# Patient Record
Sex: MALE | Race: WHITE | Employment: OTHER | URBAN - METROPOLITAN AREA
[De-identification: names, ages, dates, MRNs, and addresses within clinical notes are randomized per-mention and may not be internally consistent; named-entity substitution may affect disease eponyms.]

---

## 2017-04-11 RX ORDER — TADALAFIL 10 MG/1
10 TABLET ORAL PRN
Qty: 30 TABLET | Refills: 3 | Status: SHIPPED | OUTPATIENT
Start: 2017-04-11 | End: 2017-07-11

## 2017-06-21 ENCOUNTER — INITIAL CONSULT (OUTPATIENT)
Dept: SURGERY | Age: 57
End: 2017-06-21
Payer: MEDICAID

## 2017-06-21 VITALS — BODY MASS INDEX: 26.63 KG/M2 | HEIGHT: 70 IN | WEIGHT: 186 LBS

## 2017-06-21 DIAGNOSIS — R10.32 LEFT GROIN PAIN: Primary | ICD-10-CM

## 2017-06-21 PROCEDURE — 99204 OFFICE O/P NEW MOD 45 MIN: CPT | Performed by: SURGERY

## 2017-06-21 RX ORDER — ATORVASTATIN CALCIUM 80 MG/1
TABLET, FILM COATED ORAL
Refills: 11 | COMMUNITY
Start: 2017-06-12 | End: 2017-06-21 | Stop reason: SDUPTHER

## 2017-06-23 ENCOUNTER — HOSPITAL ENCOUNTER (OUTPATIENT)
Age: 57
Discharge: HOME | End: 2017-06-23
Payer: COMMERCIAL

## 2017-06-23 ENCOUNTER — HOSPITAL ENCOUNTER (OUTPATIENT)
Dept: ULTRASOUND IMAGING | Age: 57
Discharge: HOME | End: 2017-06-23
Payer: COMMERCIAL

## 2017-06-23 DIAGNOSIS — R10.32 LEFT GROIN PAIN: ICD-10-CM

## 2017-06-23 LAB
BILIRUBIN URINE: NEGATIVE
COLOR: YELLOW
COMMENT UA: NORMAL
GLUCOSE URINE: NEGATIVE
KETONES, URINE: NEGATIVE
LEUKOCYTE ESTERASE, URINE: NEGATIVE
NITRITE, URINE: NEGATIVE
PH UA: 6 (ref 5–8)
PROTEIN UA: NEGATIVE
SPECIFIC GRAVITY UA: 1.01 (ref 1–1.03)
TURBIDITY: CLEAR
URINE HGB: NEGATIVE
UROBILINOGEN, URINE: NORMAL

## 2017-06-23 PROCEDURE — 76881 US COMPL JOINT R-T W/IMG: CPT

## 2017-06-23 PROCEDURE — 81003 URINALYSIS AUTO W/O SCOPE: CPT

## 2017-06-24 ASSESSMENT — ENCOUNTER SYMPTOMS
SHORTNESS OF BREATH: 0
SORE THROAT: 0
VOMITING: 0
CHOKING: 0
COUGH: 0
NAUSEA: 0
ABDOMINAL PAIN: 0
BLOOD IN STOOL: 0
BACK PAIN: 0
TROUBLE SWALLOWING: 0

## 2017-06-27 ENCOUNTER — OFFICE VISIT (OUTPATIENT)
Dept: SURGERY | Age: 57
End: 2017-06-27
Payer: MEDICAID

## 2017-06-27 VITALS — HEIGHT: 70 IN | WEIGHT: 180.6 LBS | BODY MASS INDEX: 25.86 KG/M2

## 2017-06-27 DIAGNOSIS — R10.32 LEFT GROIN PAIN: Primary | ICD-10-CM

## 2017-06-27 DIAGNOSIS — K40.91 RECURRENT LEFT INGUINAL HERNIA: ICD-10-CM

## 2017-06-27 DIAGNOSIS — Z12.11 ENCOUNTER FOR SCREENING COLONOSCOPY: ICD-10-CM

## 2017-06-27 DIAGNOSIS — Z86.010 HISTORY OF COLON POLYPS: ICD-10-CM

## 2017-06-27 PROCEDURE — 99213 OFFICE O/P EST LOW 20 MIN: CPT | Performed by: SURGERY

## 2017-06-27 ASSESSMENT — ENCOUNTER SYMPTOMS
TROUBLE SWALLOWING: 0
ABDOMINAL PAIN: 0
SORE THROAT: 0
SHORTNESS OF BREATH: 0
BACK PAIN: 0
BLOOD IN STOOL: 0
COUGH: 0
CHOKING: 0
VOMITING: 0
NAUSEA: 0

## 2017-07-11 ENCOUNTER — OFFICE VISIT (OUTPATIENT)
Dept: CARDIOLOGY CLINIC | Age: 57
End: 2017-07-11
Payer: MEDICAID

## 2017-07-11 ENCOUNTER — HOSPITAL ENCOUNTER (OUTPATIENT)
Age: 57
Discharge: HOME | End: 2017-07-11
Payer: COMMERCIAL

## 2017-07-11 VITALS
BODY MASS INDEX: 26.11 KG/M2 | SYSTOLIC BLOOD PRESSURE: 120 MMHG | OXYGEN SATURATION: 99 % | WEIGHT: 182 LBS | HEART RATE: 60 BPM | DIASTOLIC BLOOD PRESSURE: 70 MMHG

## 2017-07-11 DIAGNOSIS — Z95.820 S/P ANGIOPLASTY WITH STENT: ICD-10-CM

## 2017-07-11 DIAGNOSIS — E55.9 VITAMIN D DEFICIENCY DISEASE: ICD-10-CM

## 2017-07-11 DIAGNOSIS — I10 ESSENTIAL HYPERTENSION: ICD-10-CM

## 2017-07-11 DIAGNOSIS — E78.5 HYPERLIPIDEMIA, UNSPECIFIED HYPERLIPIDEMIA TYPE: Primary | ICD-10-CM

## 2017-07-11 DIAGNOSIS — E78.5 HYPERLIPIDEMIA, UNSPECIFIED HYPERLIPIDEMIA TYPE: ICD-10-CM

## 2017-07-11 LAB
ABSOLUTE EOS #: 0.1 K/UL (ref 0–0.4)
ABSOLUTE LYMPH #: 1.2 K/UL (ref 0.9–2.5)
ABSOLUTE MONO #: 0.5 K/UL (ref 0–1)
ALBUMIN SERPL-MCNC: 4.1 G/DL (ref 3.5–5.2)
ALBUMIN/GLOBULIN RATIO: ABNORMAL (ref 1–2.5)
ALP BLD-CCNC: 71 U/L (ref 40–129)
ALT SERPL-CCNC: 27 U/L (ref 5–41)
ANION GAP SERPL CALCULATED.3IONS-SCNC: 11 MMOL/L (ref 9–17)
AST SERPL-CCNC: 23 U/L
BASOPHILS # BLD: 0 %
BASOPHILS ABSOLUTE: 0 K/UL (ref 0–0.2)
BILIRUB SERPL-MCNC: 0.59 MG/DL (ref 0.3–1.2)
BUN BLDV-MCNC: 17 MG/DL (ref 6–20)
BUN/CREAT BLD: 24 (ref 9–20)
CALCIUM SERPL-MCNC: 9.3 MG/DL (ref 8.6–10.4)
CHLORIDE BLD-SCNC: 102 MMOL/L (ref 98–107)
CHOLESTEROL/HDL RATIO: 2.6
CHOLESTEROL: 137 MG/DL
CO2: 27 MMOL/L (ref 20–31)
CREAT SERPL-MCNC: 0.7 MG/DL (ref 0.7–1.2)
DIFFERENTIAL TYPE: YES
EOSINOPHILS RELATIVE PERCENT: 2 %
GFR AFRICAN AMERICAN: >60 ML/MIN
GFR NON-AFRICAN AMERICAN: >60 ML/MIN
GFR SERPL CREATININE-BSD FRML MDRD: ABNORMAL ML/MIN/{1.73_M2}
GFR SERPL CREATININE-BSD FRML MDRD: ABNORMAL ML/MIN/{1.73_M2}
GLUCOSE BLD-MCNC: 107 MG/DL (ref 70–99)
HCT VFR BLD CALC: 43 % (ref 41–53)
HDLC SERPL-MCNC: 53 MG/DL
HEMOGLOBIN: 14.5 G/DL (ref 13.5–17.5)
LDL CHOLESTEROL: 68 MG/DL (ref 0–130)
LYMPHOCYTES # BLD: 20 %
MCH RBC QN AUTO: 33.5 PG (ref 26–34)
MCHC RBC AUTO-ENTMCNC: 33.8 G/DL (ref 31–37)
MCV RBC AUTO: 99 FL (ref 80–100)
MONOCYTES # BLD: 8 %
PATIENT FASTING?: YES
PDW BLD-RTO: 12.6 % (ref 12.1–15.2)
PLATELET # BLD: 158 K/UL (ref 140–450)
PLATELET ESTIMATE: ABNORMAL
PMV BLD AUTO: ABNORMAL FL (ref 6–12)
POTASSIUM SERPL-SCNC: 4.7 MMOL/L (ref 3.7–5.3)
RBC # BLD: 4.34 M/UL (ref 4.5–5.9)
RBC # BLD: ABNORMAL 10*6/UL
SEG NEUTROPHILS: 70 %
SEGMENTED NEUTROPHILS ABSOLUTE COUNT: 4.1 K/UL (ref 2.1–6.5)
SODIUM BLD-SCNC: 140 MMOL/L (ref 135–144)
TOTAL PROTEIN: 6.3 G/DL (ref 6.4–8.3)
TRIGL SERPL-MCNC: 81 MG/DL
TSH SERPL DL<=0.05 MIU/L-ACNC: 1.75 MIU/L (ref 0.3–5)
VLDLC SERPL CALC-MCNC: NORMAL MG/DL (ref 1–30)
WBC # BLD: 5.9 K/UL (ref 3.5–11)
WBC # BLD: ABNORMAL 10*3/UL

## 2017-07-11 PROCEDURE — 85025 COMPLETE CBC W/AUTO DIFF WBC: CPT

## 2017-07-11 PROCEDURE — 36415 COLL VENOUS BLD VENIPUNCTURE: CPT

## 2017-07-11 PROCEDURE — 84443 ASSAY THYROID STIM HORMONE: CPT

## 2017-07-11 PROCEDURE — 99214 OFFICE O/P EST MOD 30 MIN: CPT | Performed by: INTERNAL MEDICINE

## 2017-07-11 PROCEDURE — 80053 COMPREHEN METABOLIC PANEL: CPT

## 2017-07-11 PROCEDURE — 93005 ELECTROCARDIOGRAM TRACING: CPT

## 2017-07-11 PROCEDURE — 80061 LIPID PANEL: CPT

## 2017-07-12 RX ORDER — ATORVASTATIN CALCIUM 80 MG/1
80 TABLET, FILM COATED ORAL DAILY
Qty: 30 TABLET | Refills: 11 | Status: SHIPPED | OUTPATIENT
Start: 2017-07-12 | End: 2018-07-13 | Stop reason: SDUPTHER

## 2017-07-14 LAB
EKG ATRIAL RATE: 45 BPM
EKG P AXIS: 75 DEGREES
EKG P-R INTERVAL: 174 MS
EKG Q-T INTERVAL: 448 MS
EKG QRS DURATION: 96 MS
EKG QTC CALCULATION (BAZETT): 387 MS
EKG R AXIS: 72 DEGREES
EKG T AXIS: 65 DEGREES
EKG VENTRICULAR RATE: 45 BPM

## 2017-07-14 RX ORDER — HYDROCHLOROTHIAZIDE 12.5 MG/1
TABLET ORAL
Qty: 90 TABLET | Refills: 3 | Status: SHIPPED | OUTPATIENT
Start: 2017-07-14 | End: 2018-07-28 | Stop reason: SDUPTHER

## 2017-07-17 ENCOUNTER — ANESTHESIA EVENT (OUTPATIENT)
Dept: OPERATING ROOM | Age: 57
End: 2017-07-17
Payer: MEDICAID

## 2017-07-24 ENCOUNTER — HOSPITAL ENCOUNTER (OUTPATIENT)
Age: 57
Setting detail: OUTPATIENT SURGERY
Discharge: HOME | End: 2017-07-24
Attending: SURGERY | Admitting: SURGERY
Payer: MEDICAID

## 2017-07-24 ENCOUNTER — ANESTHESIA (OUTPATIENT)
Dept: OPERATING ROOM | Age: 57
End: 2017-07-24
Payer: MEDICAID

## 2017-07-24 VITALS
RESPIRATION RATE: 12 BRPM | DIASTOLIC BLOOD PRESSURE: 57 MMHG | OXYGEN SATURATION: 98 % | SYSTOLIC BLOOD PRESSURE: 95 MMHG

## 2017-07-24 VITALS
WEIGHT: 177 LBS | HEART RATE: 43 BPM | DIASTOLIC BLOOD PRESSURE: 67 MMHG | SYSTOLIC BLOOD PRESSURE: 100 MMHG | RESPIRATION RATE: 16 BRPM | TEMPERATURE: 97.8 F | OXYGEN SATURATION: 95 % | HEIGHT: 69 IN | BODY MASS INDEX: 26.22 KG/M2

## 2017-07-24 PROBLEM — Z12.11 ENCOUNTER FOR SCREENING COLONOSCOPY: Status: ACTIVE | Noted: 2017-07-24

## 2017-07-24 PROCEDURE — 7100000010 HC PHASE II RECOVERY - FIRST 15 MIN: Performed by: SURGERY

## 2017-07-24 PROCEDURE — 2580000003 HC RX 258: Performed by: SURGERY

## 2017-07-24 PROCEDURE — 6360000002 HC RX W HCPCS: Performed by: NURSE ANESTHETIST, CERTIFIED REGISTERED

## 2017-07-24 PROCEDURE — 7100000011 HC PHASE II RECOVERY - ADDTL 15 MIN: Performed by: SURGERY

## 2017-07-24 PROCEDURE — 3700000000 HC ANESTHESIA ATTENDED CARE: Performed by: SURGERY

## 2017-07-24 PROCEDURE — 3700000001 HC ADD 15 MINUTES (ANESTHESIA): Performed by: SURGERY

## 2017-07-24 PROCEDURE — 3609027000 HC COLONOSCOPY: Performed by: SURGERY

## 2017-07-24 RX ORDER — 0.9 % SODIUM CHLORIDE 0.9 %
10 VIAL (ML) INJECTION EVERY 12 HOURS SCHEDULED
Status: DISCONTINUED | OUTPATIENT
Start: 2017-07-24 | End: 2017-07-24 | Stop reason: HOSPADM

## 2017-07-24 RX ORDER — ACETAMINOPHEN 325 MG/1
650 TABLET ORAL EVERY 4 HOURS PRN
Status: DISCONTINUED | OUTPATIENT
Start: 2017-07-24 | End: 2017-07-24 | Stop reason: HOSPADM

## 2017-07-24 RX ORDER — 0.9 % SODIUM CHLORIDE 0.9 %
10 VIAL (ML) INJECTION PRN
Status: DISCONTINUED | OUTPATIENT
Start: 2017-07-24 | End: 2017-07-24 | Stop reason: HOSPADM

## 2017-07-24 RX ORDER — SODIUM CHLORIDE 0.9 % (FLUSH) 0.9 %
10 SYRINGE (ML) INJECTION PRN
Status: DISCONTINUED | OUTPATIENT
Start: 2017-07-24 | End: 2017-07-24 | Stop reason: HOSPADM

## 2017-07-24 RX ORDER — PROPOFOL 10 MG/ML
INJECTION, EMULSION INTRAVENOUS PRN
Status: DISCONTINUED | OUTPATIENT
Start: 2017-07-24 | End: 2017-07-24 | Stop reason: SDUPTHER

## 2017-07-24 RX ORDER — ONDANSETRON 2 MG/ML
4 INJECTION INTRAMUSCULAR; INTRAVENOUS EVERY 6 HOURS PRN
Status: DISCONTINUED | OUTPATIENT
Start: 2017-07-24 | End: 2017-07-24 | Stop reason: HOSPADM

## 2017-07-24 RX ORDER — SODIUM CHLORIDE, SODIUM LACTATE, POTASSIUM CHLORIDE, CALCIUM CHLORIDE 600; 310; 30; 20 MG/100ML; MG/100ML; MG/100ML; MG/100ML
INJECTION, SOLUTION INTRAVENOUS CONTINUOUS
Status: DISCONTINUED | OUTPATIENT
Start: 2017-07-24 | End: 2017-07-24 | Stop reason: HOSPADM

## 2017-07-24 RX ORDER — SODIUM CHLORIDE 0.9 % (FLUSH) 0.9 %
10 SYRINGE (ML) INJECTION EVERY 12 HOURS SCHEDULED
Status: DISCONTINUED | OUTPATIENT
Start: 2017-07-24 | End: 2017-07-24 | Stop reason: HOSPADM

## 2017-07-24 RX ORDER — SODIUM CHLORIDE, SODIUM LACTATE, POTASSIUM CHLORIDE, CALCIUM CHLORIDE 600; 310; 30; 20 MG/100ML; MG/100ML; MG/100ML; MG/100ML
INJECTION, SOLUTION INTRAVENOUS CONTINUOUS
Status: DISCONTINUED | OUTPATIENT
Start: 2017-07-24 | End: 2017-07-24 | Stop reason: SDUPTHER

## 2017-07-24 RX ADMIN — SODIUM CHLORIDE, POTASSIUM CHLORIDE, SODIUM LACTATE AND CALCIUM CHLORIDE: 600; 310; 30; 20 INJECTION, SOLUTION INTRAVENOUS at 08:27

## 2017-07-24 RX ADMIN — PROPOFOL 40 MG: 10 INJECTION, EMULSION INTRAVENOUS at 10:08

## 2017-07-24 ASSESSMENT — PAIN DESCRIPTION - DESCRIPTORS: DESCRIPTORS: ACHING

## 2017-07-24 ASSESSMENT — PAIN - FUNCTIONAL ASSESSMENT: PAIN_FUNCTIONAL_ASSESSMENT: 0-10

## 2017-07-24 ASSESSMENT — PAIN SCALES - GENERAL: PAINLEVEL_OUTOF10: 0

## 2017-07-25 ENCOUNTER — ANESTHESIA EVENT (OUTPATIENT)
Dept: OPERATING ROOM | Age: 57
End: 2017-07-25
Payer: MEDICAID

## 2017-07-31 ENCOUNTER — HOSPITAL ENCOUNTER (OUTPATIENT)
Age: 57
Setting detail: OUTPATIENT SURGERY
Discharge: HOME | End: 2017-07-31
Attending: SURGERY | Admitting: SURGERY
Payer: MEDICAID

## 2017-07-31 ENCOUNTER — ANESTHESIA (OUTPATIENT)
Dept: OPERATING ROOM | Age: 57
End: 2017-07-31
Payer: MEDICAID

## 2017-07-31 VITALS
WEIGHT: 181 LBS | HEIGHT: 70 IN | OXYGEN SATURATION: 98 % | RESPIRATION RATE: 16 BRPM | TEMPERATURE: 97 F | DIASTOLIC BLOOD PRESSURE: 77 MMHG | SYSTOLIC BLOOD PRESSURE: 127 MMHG | HEART RATE: 60 BPM | BODY MASS INDEX: 25.91 KG/M2

## 2017-07-31 VITALS
SYSTOLIC BLOOD PRESSURE: 109 MMHG | RESPIRATION RATE: 11 BRPM | TEMPERATURE: 97.9 F | OXYGEN SATURATION: 100 % | DIASTOLIC BLOOD PRESSURE: 70 MMHG

## 2017-07-31 PROBLEM — K40.91 RECURRENT LEFT INGUINAL HERNIA: Status: ACTIVE | Noted: 2017-07-31

## 2017-07-31 PROCEDURE — 3700000001 HC ADD 15 MINUTES (ANESTHESIA): Performed by: SURGERY

## 2017-07-31 PROCEDURE — 3700000000 HC ANESTHESIA ATTENDED CARE: Performed by: SURGERY

## 2017-07-31 PROCEDURE — 64486 TAP BLOCK UNIL BY INJECTION: CPT | Performed by: NURSE ANESTHETIST, CERTIFIED REGISTERED

## 2017-07-31 PROCEDURE — 6360000002 HC RX W HCPCS: Performed by: SURGERY

## 2017-07-31 PROCEDURE — 3600000013 HC SURGERY LEVEL 3 ADDTL 15MIN: Performed by: SURGERY

## 2017-07-31 PROCEDURE — 7100000011 HC PHASE II RECOVERY - ADDTL 15 MIN: Performed by: SURGERY

## 2017-07-31 PROCEDURE — C1781 MESH (IMPLANTABLE): HCPCS | Performed by: SURGERY

## 2017-07-31 PROCEDURE — 2580000003 HC RX 258: Performed by: SURGERY

## 2017-07-31 PROCEDURE — 2500000003 HC RX 250 WO HCPCS: Performed by: SURGERY

## 2017-07-31 PROCEDURE — 6370000000 HC RX 637 (ALT 250 FOR IP): Performed by: SURGERY

## 2017-07-31 PROCEDURE — 2500000003 HC RX 250 WO HCPCS: Performed by: NURSE ANESTHETIST, CERTIFIED REGISTERED

## 2017-07-31 PROCEDURE — 6360000002 HC RX W HCPCS: Performed by: NURSE ANESTHETIST, CERTIFIED REGISTERED

## 2017-07-31 PROCEDURE — 3600000003 HC SURGERY LEVEL 3 BASE: Performed by: SURGERY

## 2017-07-31 PROCEDURE — 7100000010 HC PHASE II RECOVERY - FIRST 15 MIN: Performed by: SURGERY

## 2017-07-31 DEVICE — IMPLANTABLE DEVICE: Type: IMPLANTABLE DEVICE | Status: FUNCTIONAL

## 2017-07-31 RX ORDER — ONDANSETRON 2 MG/ML
INJECTION INTRAMUSCULAR; INTRAVENOUS PRN
Status: DISCONTINUED | OUTPATIENT
Start: 2017-07-31 | End: 2017-07-31 | Stop reason: SDUPTHER

## 2017-07-31 RX ORDER — PROPOFOL 10 MG/ML
INJECTION, EMULSION INTRAVENOUS PRN
Status: DISCONTINUED | OUTPATIENT
Start: 2017-07-31 | End: 2017-07-31 | Stop reason: SDUPTHER

## 2017-07-31 RX ORDER — EPHEDRINE SULFATE 50 MG/ML
INJECTION INTRAVENOUS PRN
Status: DISCONTINUED | OUTPATIENT
Start: 2017-07-31 | End: 2017-07-31 | Stop reason: SDUPTHER

## 2017-07-31 RX ORDER — BUPIVACAINE HYDROCHLORIDE AND EPINEPHRINE 2.5; 5 MG/ML; UG/ML
INJECTION, SOLUTION EPIDURAL; INFILTRATION; INTRACAUDAL; PERINEURAL PRN
Status: DISCONTINUED | OUTPATIENT
Start: 2017-07-31 | End: 2017-07-31 | Stop reason: HOSPADM

## 2017-07-31 RX ORDER — FENTANYL CITRATE 50 UG/ML
INJECTION, SOLUTION INTRAMUSCULAR; INTRAVENOUS PRN
Status: DISCONTINUED | OUTPATIENT
Start: 2017-07-31 | End: 2017-07-31 | Stop reason: SDUPTHER

## 2017-07-31 RX ORDER — DEXAMETHASONE SODIUM PHOSPHATE 10 MG/ML
INJECTION INTRAMUSCULAR; INTRAVENOUS PRN
Status: DISCONTINUED | OUTPATIENT
Start: 2017-07-31 | End: 2017-07-31 | Stop reason: SDUPTHER

## 2017-07-31 RX ORDER — KETOROLAC TROMETHAMINE 30 MG/ML
INJECTION, SOLUTION INTRAMUSCULAR; INTRAVENOUS PRN
Status: DISCONTINUED | OUTPATIENT
Start: 2017-07-31 | End: 2017-07-31 | Stop reason: SDUPTHER

## 2017-07-31 RX ORDER — MIDAZOLAM HYDROCHLORIDE 1 MG/ML
INJECTION INTRAMUSCULAR; INTRAVENOUS PRN
Status: DISCONTINUED | OUTPATIENT
Start: 2017-07-31 | End: 2017-07-31 | Stop reason: SDUPTHER

## 2017-07-31 RX ORDER — SODIUM CHLORIDE, SODIUM LACTATE, POTASSIUM CHLORIDE, CALCIUM CHLORIDE 600; 310; 30; 20 MG/100ML; MG/100ML; MG/100ML; MG/100ML
INJECTION, SOLUTION INTRAVENOUS CONTINUOUS
Status: DISCONTINUED | OUTPATIENT
Start: 2017-07-31 | End: 2017-07-31 | Stop reason: HOSPADM

## 2017-07-31 RX ORDER — MORPHINE SULFATE 4 MG/ML
1 INJECTION, SOLUTION INTRAMUSCULAR; INTRAVENOUS
Status: DISCONTINUED | OUTPATIENT
Start: 2017-07-31 | End: 2017-07-31 | Stop reason: HOSPADM

## 2017-07-31 RX ORDER — SODIUM CHLORIDE 0.9 % (FLUSH) 0.9 %
10 SYRINGE (ML) INJECTION EVERY 12 HOURS SCHEDULED
Status: DISCONTINUED | OUTPATIENT
Start: 2017-07-31 | End: 2017-07-31 | Stop reason: HOSPADM

## 2017-07-31 RX ORDER — LIDOCAINE HYDROCHLORIDE 10 MG/ML
INJECTION, SOLUTION EPIDURAL; INFILTRATION; INTRACAUDAL; PERINEURAL PRN
Status: DISCONTINUED | OUTPATIENT
Start: 2017-07-31 | End: 2017-07-31 | Stop reason: SDUPTHER

## 2017-07-31 RX ORDER — ACETAMINOPHEN 325 MG/1
650 TABLET ORAL EVERY 4 HOURS PRN
Status: DISCONTINUED | OUTPATIENT
Start: 2017-07-31 | End: 2017-07-31 | Stop reason: HOSPADM

## 2017-07-31 RX ORDER — SODIUM CHLORIDE 0.9 % (FLUSH) 0.9 %
10 SYRINGE (ML) INJECTION PRN
Status: DISCONTINUED | OUTPATIENT
Start: 2017-07-31 | End: 2017-07-31 | Stop reason: HOSPADM

## 2017-07-31 RX ORDER — ACETAMINOPHEN 10 MG/ML
INJECTION, SOLUTION INTRAVENOUS PRN
Status: DISCONTINUED | OUTPATIENT
Start: 2017-07-31 | End: 2017-07-31 | Stop reason: SDUPTHER

## 2017-07-31 RX ORDER — LIDOCAINE HYDROCHLORIDE 10 MG/ML
INJECTION, SOLUTION EPIDURAL; INFILTRATION; INTRACAUDAL; PERINEURAL PRN
Status: DISCONTINUED | OUTPATIENT
Start: 2017-07-31 | End: 2017-07-31 | Stop reason: HOSPADM

## 2017-07-31 RX ORDER — ONDANSETRON 2 MG/ML
4 INJECTION INTRAMUSCULAR; INTRAVENOUS EVERY 6 HOURS PRN
Status: DISCONTINUED | OUTPATIENT
Start: 2017-07-31 | End: 2017-07-31 | Stop reason: HOSPADM

## 2017-07-31 RX ORDER — GINSENG 100 MG
CAPSULE ORAL PRN
Status: DISCONTINUED | OUTPATIENT
Start: 2017-07-31 | End: 2017-07-31 | Stop reason: HOSPADM

## 2017-07-31 RX ORDER — HYDROCODONE BITARTRATE AND ACETAMINOPHEN 5; 325 MG/1; MG/1
TABLET ORAL
Qty: 20 TABLET | Refills: 0 | Status: SHIPPED | OUTPATIENT
Start: 2017-07-31 | End: 2017-08-08 | Stop reason: ALTCHOICE

## 2017-07-31 RX ADMIN — DEXAMETHASONE SODIUM PHOSPHATE 10 MG: 10 INJECTION INTRAMUSCULAR; INTRAVENOUS at 09:30

## 2017-07-31 RX ADMIN — ACETAMINOPHEN 1000 MG: 10 INJECTION, SOLUTION INTRAVENOUS at 09:37

## 2017-07-31 RX ADMIN — HYDROMORPHONE HYDROCHLORIDE 0.5 MG: 1 INJECTION, SOLUTION INTRAMUSCULAR; INTRAVENOUS; SUBCUTANEOUS at 10:40

## 2017-07-31 RX ADMIN — ONDANSETRON 4 MG: 2 INJECTION INTRAMUSCULAR; INTRAVENOUS at 09:48

## 2017-07-31 RX ADMIN — FENTANYL CITRATE 50 MCG: 50 INJECTION INTRAMUSCULAR; INTRAVENOUS at 08:47

## 2017-07-31 RX ADMIN — MIDAZOLAM 3 MG: 1 INJECTION INTRAMUSCULAR; INTRAVENOUS at 08:47

## 2017-07-31 RX ADMIN — EPHEDRINE SULFATE 5 MG: 50 INJECTION, SOLUTION INTRAVENOUS at 09:20

## 2017-07-31 RX ADMIN — PROPOFOL 200 MG: 10 INJECTION, EMULSION INTRAVENOUS at 09:06

## 2017-07-31 RX ADMIN — EPHEDRINE SULFATE 5 MG: 50 INJECTION, SOLUTION INTRAVENOUS at 09:15

## 2017-07-31 RX ADMIN — MORPHINE SULFATE 1 MG: 4 INJECTION, SOLUTION INTRAMUSCULAR; INTRAVENOUS at 11:21

## 2017-07-31 RX ADMIN — HYDROMORPHONE HYDROCHLORIDE 0.5 MG: 1 INJECTION, SOLUTION INTRAMUSCULAR; INTRAVENOUS; SUBCUTANEOUS at 10:10

## 2017-07-31 RX ADMIN — LIDOCAINE HYDROCHLORIDE 40 MG: 10 INJECTION, SOLUTION EPIDURAL; INFILTRATION; INTRACAUDAL; PERINEURAL at 09:00

## 2017-07-31 RX ADMIN — MORPHINE SULFATE 1 MG: 4 INJECTION, SOLUTION INTRAMUSCULAR; INTRAVENOUS at 11:50

## 2017-07-31 RX ADMIN — SODIUM CHLORIDE, POTASSIUM CHLORIDE, SODIUM LACTATE AND CALCIUM CHLORIDE: 600; 310; 30; 20 INJECTION, SOLUTION INTRAVENOUS at 07:30

## 2017-07-31 RX ADMIN — KETOROLAC TROMETHAMINE 30 MG: 30 INJECTION, SOLUTION INTRAMUSCULAR at 09:51

## 2017-07-31 RX ADMIN — SODIUM CHLORIDE, POTASSIUM CHLORIDE, SODIUM LACTATE AND CALCIUM CHLORIDE: 600; 310; 30; 20 INJECTION, SOLUTION INTRAVENOUS at 09:32

## 2017-07-31 RX ADMIN — FENTANYL CITRATE 50 MCG: 50 INJECTION INTRAMUSCULAR; INTRAVENOUS at 09:04

## 2017-07-31 ASSESSMENT — PAIN DESCRIPTION - LOCATION: LOCATION: GROIN

## 2017-07-31 ASSESSMENT — PAIN DESCRIPTION - ORIENTATION: ORIENTATION: LEFT

## 2017-07-31 ASSESSMENT — PAIN SCALES - GENERAL: PAINLEVEL_OUTOF10: 10

## 2017-07-31 ASSESSMENT — PAIN - FUNCTIONAL ASSESSMENT: PAIN_FUNCTIONAL_ASSESSMENT: 0-10

## 2017-07-31 ASSESSMENT — PAIN DESCRIPTION - PAIN TYPE: TYPE: SURGICAL PAIN

## 2017-07-31 ASSESSMENT — PAIN DESCRIPTION - DESCRIPTORS: DESCRIPTORS: SORE

## 2017-08-08 ENCOUNTER — OFFICE VISIT (OUTPATIENT)
Dept: SURGERY | Age: 57
End: 2017-08-08

## 2017-08-08 VITALS
WEIGHT: 178.8 LBS | TEMPERATURE: 97.9 F | HEART RATE: 61 BPM | BODY MASS INDEX: 25.6 KG/M2 | DIASTOLIC BLOOD PRESSURE: 79 MMHG | RESPIRATION RATE: 16 BRPM | HEIGHT: 70 IN | SYSTOLIC BLOOD PRESSURE: 125 MMHG

## 2017-08-08 DIAGNOSIS — Z87.19 S/P INGUINAL HERNIA REPAIR: Primary | ICD-10-CM

## 2017-08-08 DIAGNOSIS — Z98.890 S/P INGUINAL HERNIA REPAIR: Primary | ICD-10-CM

## 2017-08-08 PROCEDURE — 99024 POSTOP FOLLOW-UP VISIT: CPT | Performed by: SURGERY

## 2017-12-04 RX ORDER — LISINOPRIL 10 MG/1
TABLET ORAL
Qty: 30 TABLET | Refills: 2 | Status: SHIPPED | OUTPATIENT
Start: 2017-12-04 | End: 2018-01-23 | Stop reason: CLARIF

## 2018-01-23 ENCOUNTER — TELEPHONE (OUTPATIENT)
Dept: CARDIOLOGY CLINIC | Age: 58
End: 2018-01-23

## 2018-01-23 RX ORDER — LISINOPRIL 10 MG/1
5 TABLET ORAL DAILY
COMMUNITY
End: 2018-06-05 | Stop reason: SDUPTHER

## 2018-01-23 NOTE — TELEPHONE ENCOUNTER
Talked with DR Josefina Tillman to decrease lisinopril to 1/2 tab daily  Will keep track of bp let us know

## 2018-01-23 NOTE — TELEPHONE ENCOUNTER
Early this a.m., when stands up or going up stairs or getting up from chair, dizzy, lightheaded. /70, typically not this low. Should meds be adjusted?

## 2018-06-05 DIAGNOSIS — I10 ESSENTIAL HYPERTENSION: ICD-10-CM

## 2018-06-05 DIAGNOSIS — E55.9 VITAMIN D DEFICIENCY: ICD-10-CM

## 2018-06-05 DIAGNOSIS — Z95.820 S/P ANGIOPLASTY WITH STENT: ICD-10-CM

## 2018-06-05 DIAGNOSIS — E78.49 OTHER HYPERLIPIDEMIA: Primary | ICD-10-CM

## 2018-06-05 RX ORDER — LISINOPRIL 10 MG/1
5 TABLET ORAL DAILY
Qty: 30 TABLET | Refills: 3 | Status: SHIPPED | OUTPATIENT
Start: 2018-06-05 | End: 2019-01-27 | Stop reason: SDUPTHER

## 2018-06-07 ENCOUNTER — TELEPHONE (OUTPATIENT)
Dept: CARDIOLOGY CLINIC | Age: 58
End: 2018-06-07

## 2018-07-13 RX ORDER — ATORVASTATIN CALCIUM 80 MG/1
80 TABLET, FILM COATED ORAL DAILY
Qty: 30 TABLET | Refills: 11 | Status: SHIPPED | OUTPATIENT
Start: 2018-07-13 | End: 2019-06-25 | Stop reason: SDUPTHER

## 2018-07-30 RX ORDER — HYDROCHLOROTHIAZIDE 12.5 MG/1
TABLET ORAL
Qty: 30 TABLET | Refills: 2 | Status: SHIPPED | OUTPATIENT
Start: 2018-07-30 | End: 2018-10-28 | Stop reason: SDUPTHER

## 2018-08-10 ENCOUNTER — HOSPITAL ENCOUNTER (OUTPATIENT)
Age: 58
Discharge: HOME | End: 2018-08-10
Payer: MEDICAID

## 2018-08-10 ENCOUNTER — HOSPITAL ENCOUNTER (OUTPATIENT)
Dept: GENERAL RADIOLOGY | Age: 58
Discharge: HOME | End: 2018-08-12
Payer: MEDICAID

## 2018-08-10 ENCOUNTER — HOSPITAL ENCOUNTER (OUTPATIENT)
Age: 58
Discharge: HOME | End: 2018-08-12
Payer: MEDICAID

## 2018-08-10 ENCOUNTER — OFFICE VISIT (OUTPATIENT)
Dept: CARDIOLOGY CLINIC | Age: 58
End: 2018-08-10
Payer: MEDICAID

## 2018-08-10 VITALS
HEART RATE: 41 BPM | OXYGEN SATURATION: 98 % | WEIGHT: 184 LBS | SYSTOLIC BLOOD PRESSURE: 118 MMHG | BODY MASS INDEX: 26.4 KG/M2 | DIASTOLIC BLOOD PRESSURE: 70 MMHG

## 2018-08-10 DIAGNOSIS — I10 ESSENTIAL HYPERTENSION: ICD-10-CM

## 2018-08-10 DIAGNOSIS — Z95.820 S/P ANGIOPLASTY WITH STENT: ICD-10-CM

## 2018-08-10 DIAGNOSIS — E55.9 VITAMIN D DEFICIENCY DISEASE: ICD-10-CM

## 2018-08-10 DIAGNOSIS — E78.49 OTHER HYPERLIPIDEMIA: ICD-10-CM

## 2018-08-10 DIAGNOSIS — E78.5 HYPERLIPIDEMIA, UNSPECIFIED HYPERLIPIDEMIA TYPE: ICD-10-CM

## 2018-08-10 DIAGNOSIS — I10 ESSENTIAL HYPERTENSION: Primary | ICD-10-CM

## 2018-08-10 DIAGNOSIS — E55.9 VITAMIN D DEFICIENCY: ICD-10-CM

## 2018-08-10 LAB
ABSOLUTE EOS #: 0.4 K/UL (ref 0–0.4)
ABSOLUTE IMMATURE GRANULOCYTE: ABNORMAL K/UL (ref 0–0.3)
ABSOLUTE LYMPH #: 1.2 K/UL (ref 1–4.8)
ABSOLUTE MONO #: 0.4 K/UL (ref 0–1)
ALBUMIN SERPL-MCNC: 4.6 G/DL (ref 3.5–5.2)
ALBUMIN/GLOBULIN RATIO: ABNORMAL (ref 1–2.5)
ALP BLD-CCNC: 86 U/L (ref 40–129)
ALT SERPL-CCNC: 31 U/L (ref 5–41)
ANION GAP SERPL CALCULATED.3IONS-SCNC: 10 MMOL/L (ref 9–17)
AST SERPL-CCNC: 21 U/L
BASOPHILS # BLD: 0 % (ref 0–2)
BASOPHILS ABSOLUTE: 0 K/UL (ref 0–0.2)
BILIRUB SERPL-MCNC: 0.62 MG/DL (ref 0.3–1.2)
BUN BLDV-MCNC: 21 MG/DL (ref 6–20)
BUN/CREAT BLD: 29 (ref 9–20)
CALCIUM SERPL-MCNC: 9.3 MG/DL (ref 8.6–10.4)
CHLORIDE BLD-SCNC: 100 MMOL/L (ref 98–107)
CHOLESTEROL/HDL RATIO: 2.7
CHOLESTEROL: 130 MG/DL
CO2: 25 MMOL/L (ref 20–31)
CREAT SERPL-MCNC: 0.72 MG/DL (ref 0.7–1.2)
DIFFERENTIAL TYPE: YES
EKG ATRIAL RATE: 41 BPM
EKG P AXIS: 77 DEGREES
EKG P-R INTERVAL: 182 MS
EKG Q-T INTERVAL: 470 MS
EKG QRS DURATION: 94 MS
EKG QTC CALCULATION (BAZETT): 387 MS
EKG R AXIS: 50 DEGREES
EKG T AXIS: 54 DEGREES
EKG VENTRICULAR RATE: 41 BPM
EOSINOPHILS RELATIVE PERCENT: 6 % (ref 0–5)
GFR AFRICAN AMERICAN: >60 ML/MIN
GFR NON-AFRICAN AMERICAN: >60 ML/MIN
GFR SERPL CREATININE-BSD FRML MDRD: ABNORMAL ML/MIN/{1.73_M2}
GFR SERPL CREATININE-BSD FRML MDRD: ABNORMAL ML/MIN/{1.73_M2}
GLUCOSE BLD-MCNC: 106 MG/DL (ref 70–99)
HCT VFR BLD CALC: 42.1 % (ref 41–53)
HDLC SERPL-MCNC: 48 MG/DL
HEMOGLOBIN: 14.3 G/DL (ref 13.5–17.5)
IMMATURE GRANULOCYTES: ABNORMAL %
LDL CHOLESTEROL: 71 MG/DL (ref 0–130)
LYMPHOCYTES # BLD: 19 % (ref 13–44)
MAGNESIUM: 2.1 MG/DL (ref 1.6–2.6)
MCH RBC QN AUTO: 31.8 PG (ref 26–34)
MCHC RBC AUTO-ENTMCNC: 34.1 G/DL (ref 31–37)
MCV RBC AUTO: 93.3 FL (ref 80–100)
MONOCYTES # BLD: 7 % (ref 5–9)
NRBC AUTOMATED: ABNORMAL PER 100 WBC
PATIENT FASTING?: YES
PDW BLD-RTO: 13.3 % (ref 12.1–15.2)
PLATELET # BLD: 186 K/UL (ref 140–450)
PLATELET ESTIMATE: ABNORMAL
PMV BLD AUTO: ABNORMAL FL (ref 6–12)
POTASSIUM SERPL-SCNC: 4.2 MMOL/L (ref 3.7–5.3)
RBC # BLD: 4.51 M/UL (ref 4.5–5.9)
RBC # BLD: ABNORMAL 10*6/UL
SEG NEUTROPHILS: 68 % (ref 39–75)
SEGMENTED NEUTROPHILS ABSOLUTE COUNT: 4.3 K/UL (ref 2.1–6.5)
SODIUM BLD-SCNC: 135 MMOL/L (ref 135–144)
TOTAL PROTEIN: 6.6 G/DL (ref 6.4–8.3)
TRIGL SERPL-MCNC: 57 MG/DL
TSH SERPL DL<=0.05 MIU/L-ACNC: 1.3 MIU/L (ref 0.3–5)
VITAMIN D 25-HYDROXY: 34.8 NG/ML (ref 30–100)
VLDLC SERPL CALC-MCNC: NORMAL MG/DL (ref 1–30)
WBC # BLD: 6.3 K/UL (ref 3.5–11)
WBC # BLD: ABNORMAL 10*3/UL

## 2018-08-10 PROCEDURE — 36415 COLL VENOUS BLD VENIPUNCTURE: CPT

## 2018-08-10 PROCEDURE — G8427 DOCREV CUR MEDS BY ELIG CLIN: HCPCS | Performed by: INTERNAL MEDICINE

## 2018-08-10 PROCEDURE — 80053 COMPREHEN METABOLIC PANEL: CPT

## 2018-08-10 PROCEDURE — G8419 CALC BMI OUT NRM PARAM NOF/U: HCPCS | Performed by: INTERNAL MEDICINE

## 2018-08-10 PROCEDURE — 82306 VITAMIN D 25 HYDROXY: CPT

## 2018-08-10 PROCEDURE — 80061 LIPID PANEL: CPT

## 2018-08-10 PROCEDURE — 84443 ASSAY THYROID STIM HORMONE: CPT

## 2018-08-10 PROCEDURE — 93005 ELECTROCARDIOGRAM TRACING: CPT

## 2018-08-10 PROCEDURE — 3017F COLORECTAL CA SCREEN DOC REV: CPT | Performed by: INTERNAL MEDICINE

## 2018-08-10 PROCEDURE — 71046 X-RAY EXAM CHEST 2 VIEWS: CPT

## 2018-08-10 PROCEDURE — 4004F PT TOBACCO SCREEN RCVD TLK: CPT | Performed by: INTERNAL MEDICINE

## 2018-08-10 PROCEDURE — 99214 OFFICE O/P EST MOD 30 MIN: CPT | Performed by: INTERNAL MEDICINE

## 2018-08-10 PROCEDURE — 83735 ASSAY OF MAGNESIUM: CPT

## 2018-08-10 PROCEDURE — 85025 COMPLETE CBC W/AUTO DIFF WBC: CPT

## 2018-08-10 RX ORDER — CARVEDILOL 3.12 MG/1
3.12 TABLET ORAL 2 TIMES DAILY WITH MEALS
Qty: 60 TABLET | Refills: 11 | Status: SHIPPED | OUTPATIENT
Start: 2018-08-10 | End: 2019-07-26 | Stop reason: SDUPTHER

## 2018-08-13 NOTE — PROGRESS NOTES
The aorta was not enlarged. No hepatomegaly, splenomegaly. EXTREMITIES:  Good femoral pulses. Good pedal pulses. No pedal edema. Skin was warm and dry. No calf tenderness. Nail beds pink. Good cap refill. PULSES:  Bilaterally symmetrical radial, brachial and carotid pulses. No carotid bruits. Good femoral and pedal pulses. NEUROLOGIC EXAM:  Within normal limits. PSYCHIATRIC EXAM:  Within normal limits. LABORATORY DATA:  Done today, his sodium was 135, potassium 4.2, BUN 21, creatinine 0.72. GFR greater than 60. Magnesium 2.1. Glucose 106. His cholesterol was 130 with an HDL of 48, LDL of 71, triglycerides 57. ALT was 31, AST was 21. TSH was 1.30. Vitamin D 34.8. White count 6.3, hemoglobin 14.3 with a platelet count of 627,310. EKG showed marked sinus bradycardia with a heart rate of 41, otherwise nonspecific ST changes. Chest x-ray has not been read as of yet but was unremarkable. I did not see any pathology on the chest x-ray. IMPRESSION:  1. Sick sinus syndrome with bradycardia, heart rate of 41, on 12.5 mg of Lopressor daily, although he is asymptomatic. 2.  Coronary artery disease. 3.  Catheterization on 12/09/2015 that showed 70% LAD with 40% disease in circumflex and right coronary artery and normal EF of 60%. 4. Angioplasty of the LAD on 12/16/2015, placing a 2.75 x 33 mm drug-eluting Xience stent with a good end result. 5.  Hyperlipidemia, under excellent control. 6.  Hypertension, under excellent control. 7.  Family history of CAD. PLAN:  1. Stop Lopressor. 2.  Start Coreg 3.125 mg b.i.d.  3.  He will call us in several weeks to let us know what his heart rate has been doing at home. DISCUSSION:  Mr. Laura Solo overall is doing well. He has had no chest pain, chest discomfort, or any unusual symptoms that would indicate that he is developing angina. He is bradycardic and he does have sick sinus syndrome.   Currently, he is asymptomatic, but still I

## 2018-09-26 PROBLEM — Z12.11 ENCOUNTER FOR SCREENING COLONOSCOPY: Status: RESOLVED | Noted: 2017-07-24 | Resolved: 2018-09-26

## 2018-10-29 RX ORDER — HYDROCHLOROTHIAZIDE 12.5 MG/1
TABLET ORAL
Qty: 30 TABLET | Refills: 1 | Status: SHIPPED | OUTPATIENT
Start: 2018-10-29 | End: 2019-02-26 | Stop reason: SDUPTHER

## 2018-11-01 RX ORDER — HYDROCHLOROTHIAZIDE 12.5 MG/1
TABLET ORAL
Qty: 30 TABLET | Refills: 1 | Status: SHIPPED | OUTPATIENT
Start: 2018-11-01 | End: 2020-08-18 | Stop reason: ALTCHOICE

## 2019-01-28 RX ORDER — LISINOPRIL 10 MG/1
TABLET ORAL
Qty: 15 TABLET | Refills: 2 | Status: SHIPPED | OUTPATIENT
Start: 2019-01-28 | End: 2019-04-01 | Stop reason: SDUPTHER

## 2019-02-27 RX ORDER — HYDROCHLOROTHIAZIDE 12.5 MG/1
TABLET ORAL
Qty: 30 TABLET | Refills: 0 | Status: SHIPPED | OUTPATIENT
Start: 2019-02-27 | End: 2019-08-09 | Stop reason: CLARIF

## 2019-04-01 RX ORDER — LISINOPRIL 10 MG/1
TABLET ORAL
Qty: 15 TABLET | Refills: 1 | Status: SHIPPED | OUTPATIENT
Start: 2019-04-01 | End: 2019-05-20 | Stop reason: SDUPTHER

## 2019-05-21 RX ORDER — LISINOPRIL 10 MG/1
TABLET ORAL
Qty: 15 TABLET | Refills: 0 | Status: SHIPPED | OUTPATIENT
Start: 2019-05-21 | End: 2019-07-23 | Stop reason: SDUPTHER

## 2019-05-24 RX ORDER — LISINOPRIL 10 MG/1
TABLET ORAL
Qty: 15 TABLET | Refills: 0 | Status: SHIPPED | OUTPATIENT
Start: 2019-05-24 | End: 2019-07-26 | Stop reason: SDUPTHER

## 2019-06-25 RX ORDER — ATORVASTATIN CALCIUM 80 MG/1
TABLET, FILM COATED ORAL
Qty: 30 TABLET | Refills: 10 | Status: SHIPPED | OUTPATIENT
Start: 2019-06-25 | End: 2019-07-26 | Stop reason: SDUPTHER

## 2019-07-24 RX ORDER — LISINOPRIL 10 MG/1
TABLET ORAL
Qty: 15 TABLET | Refills: 0 | Status: SHIPPED | OUTPATIENT
Start: 2019-07-24 | End: 2019-08-09 | Stop reason: CLARIF

## 2019-07-26 RX ORDER — ATORVASTATIN CALCIUM 80 MG/1
TABLET, FILM COATED ORAL
Qty: 90 TABLET | Refills: 3 | Status: SHIPPED | OUTPATIENT
Start: 2019-07-26 | End: 2020-07-27

## 2019-07-26 RX ORDER — CARVEDILOL 3.12 MG/1
3.12 TABLET ORAL 2 TIMES DAILY WITH MEALS
Qty: 180 TABLET | Refills: 3 | Status: SHIPPED | OUTPATIENT
Start: 2019-07-26 | End: 2019-08-09 | Stop reason: ALTCHOICE

## 2019-07-26 RX ORDER — LISINOPRIL 10 MG/1
TABLET ORAL
Qty: 45 TABLET | Refills: 3 | Status: SHIPPED | OUTPATIENT
Start: 2019-07-26 | End: 2020-07-27

## 2019-08-09 ENCOUNTER — OFFICE VISIT (OUTPATIENT)
Dept: CARDIOLOGY CLINIC | Age: 59
End: 2019-08-09
Payer: COMMERCIAL

## 2019-08-09 ENCOUNTER — HOSPITAL ENCOUNTER (OUTPATIENT)
Age: 59
Discharge: HOME | End: 2019-08-09
Payer: COMMERCIAL

## 2019-08-09 ENCOUNTER — HOSPITAL ENCOUNTER (OUTPATIENT)
Age: 59
Discharge: HOME | End: 2019-08-11
Payer: COMMERCIAL

## 2019-08-09 ENCOUNTER — HOSPITAL ENCOUNTER (OUTPATIENT)
Dept: GENERAL RADIOLOGY | Age: 59
Discharge: HOME | End: 2019-08-11
Payer: COMMERCIAL

## 2019-08-09 VITALS
WEIGHT: 193 LBS | OXYGEN SATURATION: 98 % | HEART RATE: 42 BPM | DIASTOLIC BLOOD PRESSURE: 70 MMHG | BODY MASS INDEX: 27.69 KG/M2 | SYSTOLIC BLOOD PRESSURE: 120 MMHG

## 2019-08-09 DIAGNOSIS — I10 ESSENTIAL HYPERTENSION: ICD-10-CM

## 2019-08-09 DIAGNOSIS — E55.9 VITAMIN D DEFICIENCY DISEASE: ICD-10-CM

## 2019-08-09 DIAGNOSIS — E78.49 OTHER HYPERLIPIDEMIA: ICD-10-CM

## 2019-08-09 DIAGNOSIS — Z95.820 S/P ANGIOPLASTY WITH STENT: ICD-10-CM

## 2019-08-09 DIAGNOSIS — I10 ESSENTIAL HYPERTENSION: Primary | ICD-10-CM

## 2019-08-09 LAB
ABSOLUTE EOS #: 0.1 K/UL (ref 0–0.4)
ABSOLUTE IMMATURE GRANULOCYTE: ABNORMAL K/UL (ref 0–0.3)
ABSOLUTE LYMPH #: 1.1 K/UL (ref 1–4.8)
ABSOLUTE MONO #: 0.4 K/UL (ref 0–1)
ALBUMIN SERPL-MCNC: 4.6 G/DL (ref 3.5–5.2)
ALBUMIN/GLOBULIN RATIO: ABNORMAL (ref 1–2.5)
ALP BLD-CCNC: 82 U/L (ref 40–129)
ALT SERPL-CCNC: 30 U/L (ref 5–41)
ANION GAP SERPL CALCULATED.3IONS-SCNC: 9 MMOL/L (ref 9–17)
AST SERPL-CCNC: 18 U/L
BASOPHILS # BLD: 0 % (ref 0–2)
BASOPHILS ABSOLUTE: 0 K/UL (ref 0–0.2)
BILIRUB SERPL-MCNC: 0.63 MG/DL (ref 0.3–1.2)
BUN BLDV-MCNC: 18 MG/DL (ref 6–20)
BUN/CREAT BLD: 23 (ref 9–20)
CALCIUM SERPL-MCNC: 10 MG/DL (ref 8.6–10.4)
CHLORIDE BLD-SCNC: 103 MMOL/L (ref 98–107)
CHOLESTEROL/HDL RATIO: 3
CHOLESTEROL: 130 MG/DL
CO2: 25 MMOL/L (ref 20–31)
CREAT SERPL-MCNC: 0.78 MG/DL (ref 0.7–1.2)
DIFFERENTIAL TYPE: YES
EOSINOPHILS RELATIVE PERCENT: 2 % (ref 0–5)
GFR AFRICAN AMERICAN: >60 ML/MIN
GFR NON-AFRICAN AMERICAN: >60 ML/MIN
GFR SERPL CREATININE-BSD FRML MDRD: ABNORMAL ML/MIN/{1.73_M2}
GFR SERPL CREATININE-BSD FRML MDRD: ABNORMAL ML/MIN/{1.73_M2}
GLUCOSE BLD-MCNC: 113 MG/DL (ref 70–99)
HCT VFR BLD CALC: 41.3 % (ref 41–53)
HDLC SERPL-MCNC: 43 MG/DL
HEMOGLOBIN: 14.4 G/DL (ref 13.5–17.5)
IMMATURE GRANULOCYTES: ABNORMAL %
LDL CHOLESTEROL: 72 MG/DL (ref 0–130)
LYMPHOCYTES # BLD: 21 % (ref 13–44)
MAGNESIUM: 2.3 MG/DL (ref 1.6–2.6)
MCH RBC QN AUTO: 32.5 PG (ref 26–34)
MCHC RBC AUTO-ENTMCNC: 34.9 G/DL (ref 31–37)
MCV RBC AUTO: 93.1 FL (ref 80–100)
MONOCYTES # BLD: 8 % (ref 5–9)
NRBC AUTOMATED: ABNORMAL PER 100 WBC
PATIENT FASTING?: YES
PDW BLD-RTO: 13.7 % (ref 12.1–15.2)
PLATELET # BLD: 180 K/UL (ref 140–450)
PLATELET ESTIMATE: ABNORMAL
PMV BLD AUTO: ABNORMAL FL (ref 6–12)
POTASSIUM SERPL-SCNC: 4.4 MMOL/L (ref 3.7–5.3)
RBC # BLD: 4.44 M/UL (ref 4.5–5.9)
RBC # BLD: ABNORMAL 10*6/UL
SEG NEUTROPHILS: 69 % (ref 39–75)
SEGMENTED NEUTROPHILS ABSOLUTE COUNT: 3.6 K/UL (ref 2.1–6.5)
SODIUM BLD-SCNC: 137 MMOL/L (ref 135–144)
TOTAL PROTEIN: 7.3 G/DL (ref 6.4–8.3)
TRIGL SERPL-MCNC: 73 MG/DL
TSH SERPL DL<=0.05 MIU/L-ACNC: 0.78 MIU/L (ref 0.3–5)
VITAMIN D 25-HYDROXY: 33.2 NG/ML (ref 30–100)
VLDLC SERPL CALC-MCNC: NORMAL MG/DL (ref 1–30)
WBC # BLD: 5.2 K/UL (ref 3.5–11)
WBC # BLD: ABNORMAL 10*3/UL

## 2019-08-09 PROCEDURE — 80053 COMPREHEN METABOLIC PANEL: CPT

## 2019-08-09 PROCEDURE — 83735 ASSAY OF MAGNESIUM: CPT

## 2019-08-09 PROCEDURE — 84443 ASSAY THYROID STIM HORMONE: CPT

## 2019-08-09 PROCEDURE — 4004F PT TOBACCO SCREEN RCVD TLK: CPT | Performed by: INTERNAL MEDICINE

## 2019-08-09 PROCEDURE — 36415 COLL VENOUS BLD VENIPUNCTURE: CPT

## 2019-08-09 PROCEDURE — 82306 VITAMIN D 25 HYDROXY: CPT

## 2019-08-09 PROCEDURE — 93005 ELECTROCARDIOGRAM TRACING: CPT

## 2019-08-09 PROCEDURE — 80061 LIPID PANEL: CPT

## 2019-08-09 PROCEDURE — G8427 DOCREV CUR MEDS BY ELIG CLIN: HCPCS | Performed by: INTERNAL MEDICINE

## 2019-08-09 PROCEDURE — 3017F COLORECTAL CA SCREEN DOC REV: CPT | Performed by: INTERNAL MEDICINE

## 2019-08-09 PROCEDURE — 85025 COMPLETE CBC W/AUTO DIFF WBC: CPT

## 2019-08-09 PROCEDURE — 71046 X-RAY EXAM CHEST 2 VIEWS: CPT

## 2019-08-09 PROCEDURE — G8419 CALC BMI OUT NRM PARAM NOF/U: HCPCS | Performed by: INTERNAL MEDICINE

## 2019-08-09 PROCEDURE — 99214 OFFICE O/P EST MOD 30 MIN: CPT | Performed by: INTERNAL MEDICINE

## 2019-08-09 RX ORDER — CARVEDILOL 3.12 MG/1
3.12 TABLET ORAL DAILY
COMMUNITY
End: 2022-03-14 | Stop reason: SDUPTHER

## 2019-08-12 LAB
EKG ATRIAL RATE: 41 BPM
EKG P AXIS: 70 DEGREES
EKG P-R INTERVAL: 180 MS
EKG Q-T INTERVAL: 464 MS
EKG QRS DURATION: 90 MS
EKG QTC CALCULATION (BAZETT): 382 MS
EKG R AXIS: 64 DEGREES
EKG T AXIS: 57 DEGREES
EKG VENTRICULAR RATE: 41 BPM

## 2019-08-12 PROCEDURE — 93010 ELECTROCARDIOGRAM REPORT: CPT | Performed by: INTERNAL MEDICINE

## 2019-08-16 NOTE — PROGRESS NOTES
Disease:  Negative. Diabetes:  Negative. Smoking:  Negative. MEDICATIONS AT THIS TIME:  He is on aspirin 81 mg daily, Lipitor 80 mg daily, Coreg 3.125 mg b.i.d., HydroDIURIL 12.5 mg daily, lisinopril 10 mg half a tablet daily. PAST MEDICAL HISTORY:  1.  Palpitations. 2.  Hypertension in . 3.  Hyperlipidemia. 4.  Hernia repair 11 years ago on the right side, with a left inguinal hernia repair on 2017. FAMILY HISTORY:  Mother had stents. Multiple aunts and uncles had stenting. Father  at 67. He had 7 brothers and sisters. SOCIAL HISTORY:  He is 61years old. Son, 29, was a drug and alcohol user, and has been sober for 3 years. 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 He does not smoke or drink alcohol. Does not exercise. Stays active. REVIEW OF SYSTEMS:  Cardiac as above. Other systems reviewed including constitutional, eyes, ears, nose and throat, cardiovascular, respiratory, GI, , musculoskeletal, integumentary, neurologic, psychiatric, endocrine, hematologic and allergic/immunologic are negative except for what is described above. PHYSICAL EXAMINATION:  VITAL SIGNS:  His blood pressure was 120/70 with a heart rate of 48 and regular. Respiratory rate 18. O2 saturation 98%. Weight 193 pounds. GENERAL:  He is a pleasant 80-year-old gentleman. Denied pain. He was oriented to person, place and time. Answered questions appropriately. SKIN:  No unusual skin changes. HEENT:  The pupils are equally round and reactive to light and accommodation. Extraocular movements were intact. Mucous membranes were dry. NECK:  No JVD. Good carotid pulses. No carotid bruits. No lymphadenopathy or thyromegaly. CARDIOVASCULAR EXAM:  S1 and S2 were normal.  No S3 or S4. Soft systolic blowing type murmur. No diastolic murmur. PMI was normal.  No lift, thrust, or pericardial friction rub.   LUNGS:  Quite clear to auscultation and percussion. ABDOMEN:  Soft and nontender. Good bowel sounds. EXTREMITIES:  Good femoral pulses. Good pedal pulses. No pedal edema. Skin was warm and dry. No calf tenderness. Nail beds pink. Good cap refill. PULSES:  Bilateral symmetrical radial, brachial and carotid pulses. No carotid bruits. Good femoral and pedal pulses. NEUROLOGIC EXAM:  Within normal limits. PSYCHIATRIC EXAM:  Within normal limits. LABORATORY DATA:  From 08/09/2019, white count was 5.2, hemoglobin 14.4 with a platelet count 780,678. Sodium was 137, potassium 4.4, BUN 18, creatinine 0.78, GFR greater than 60, magnesium 2.3, glucose 113. Cholesterol 138 with an HDL of 43, LDL 72, triglycerides 73. ALT was 30, AST was 18. TSH 0.78. EKG showed sinus bradycardia with heart rate of 41 with nonspecific ST changes. Chest x-ray has not been read as of yet but I did not see anything change and look good to me. Bedside echocardiogram showed normal LV size and function. IMPRESSION:  1. Coronary artery disease. 2.  Catheterization on 12/09/2015, that showed 70% LAD with 40% circumflex and right coronary artery, with normal EF of 60%. 3.  Angioplasty of LAD on 12/16/2015, placing 2.75 x 33 mm drug-eluting Xience stent. 4.  Hyperlipidemia, under excellent control, with his current LDL at 72, HDL 43.  5.  Hypertension, under good control. 6.  Family history of coronary artery disease. 7.  Mild sick sinus syndrome with heart rate of 41 on Coreg 3.125 mg b.i.d. PLAN:  1. Decrease Coreg to 3.125 mg once a day in the evening. 2.  See in 1 year. 3.  Start an exercise program and if he would have any unusual shortness of breath, loss of energy or chest pain, I would want to see him as he does have multiple risk factors of CAD and has had angioplasty of the LAD. DISCUSSION:  Mr. Gerry Chacko overall is doing well.   He has had no chest pain or chest discomfort, any unusual shortness of breath or loss of

## 2019-08-19 RX ORDER — LISINOPRIL 10 MG/1
TABLET ORAL
Qty: 15 TABLET | Refills: 0 | Status: SHIPPED | OUTPATIENT
Start: 2019-08-19 | End: 2020-11-19 | Stop reason: DRUGHIGH

## 2019-08-19 RX ORDER — CARVEDILOL 3.12 MG/1
TABLET ORAL
Qty: 8 TABLET | Refills: 10 | Status: SHIPPED | OUTPATIENT
Start: 2019-08-19 | End: 2020-07-08

## 2020-07-07 ENCOUNTER — TELEPHONE (OUTPATIENT)
Dept: CARDIOLOGY CLINIC | Age: 60
End: 2020-07-07

## 2020-07-07 NOTE — TELEPHONE ENCOUNTER
Patient has a CDL physical coming up in Sep. They require a stress test done every so often in order to clear him. He would like to know if we can order it and he have it done on the same day of his apt.

## 2020-07-08 RX ORDER — CARVEDILOL 3.12 MG/1
TABLET ORAL
Qty: 180 TABLET | Refills: 3 | Status: SHIPPED | OUTPATIENT
Start: 2020-07-08 | End: 2020-08-18 | Stop reason: ALTCHOICE

## 2020-07-27 RX ORDER — LISINOPRIL 10 MG/1
TABLET ORAL
Qty: 45 TABLET | Refills: 3 | Status: SHIPPED | OUTPATIENT
Start: 2020-07-27 | End: 2020-08-18 | Stop reason: SDUPTHER

## 2020-07-27 RX ORDER — ATORVASTATIN CALCIUM 80 MG/1
TABLET, FILM COATED ORAL
Qty: 90 TABLET | Refills: 3 | Status: SHIPPED | OUTPATIENT
Start: 2020-07-27 | End: 2021-07-20

## 2020-08-18 ENCOUNTER — HOSPITAL ENCOUNTER (OUTPATIENT)
Age: 60
Discharge: HOME | End: 2020-08-18
Payer: COMMERCIAL

## 2020-08-18 ENCOUNTER — HOSPITAL ENCOUNTER (OUTPATIENT)
Age: 60
Discharge: HOME | End: 2020-08-20
Payer: COMMERCIAL

## 2020-08-18 ENCOUNTER — HOSPITAL ENCOUNTER (OUTPATIENT)
Dept: GENERAL RADIOLOGY | Age: 60
Discharge: HOME | End: 2020-08-20
Payer: COMMERCIAL

## 2020-08-18 ENCOUNTER — HOSPITAL ENCOUNTER (OUTPATIENT)
Dept: NON INVASIVE DIAGNOSTICS | Age: 60
Discharge: HOME | End: 2020-08-18
Payer: COMMERCIAL

## 2020-08-18 ENCOUNTER — OFFICE VISIT (OUTPATIENT)
Dept: CARDIOLOGY CLINIC | Age: 60
End: 2020-08-18
Payer: COMMERCIAL

## 2020-08-18 VITALS
DIASTOLIC BLOOD PRESSURE: 70 MMHG | BODY MASS INDEX: 29.13 KG/M2 | SYSTOLIC BLOOD PRESSURE: 136 MMHG | HEART RATE: 82 BPM | WEIGHT: 203 LBS | OXYGEN SATURATION: 97 %

## 2020-08-18 LAB
ABSOLUTE EOS #: 0.1 K/UL (ref 0–0.4)
ABSOLUTE IMMATURE GRANULOCYTE: NORMAL K/UL (ref 0–0.3)
ABSOLUTE LYMPH #: 1.3 K/UL (ref 1–4.8)
ABSOLUTE MONO #: 0.4 K/UL (ref 0–1)
ALBUMIN SERPL-MCNC: 4.7 G/DL (ref 3.5–5.2)
ALBUMIN/GLOBULIN RATIO: ABNORMAL (ref 1–2.5)
ALP BLD-CCNC: 77 U/L (ref 40–129)
ALT SERPL-CCNC: 25 U/L (ref 5–41)
ANION GAP SERPL CALCULATED.3IONS-SCNC: 8 MMOL/L (ref 9–17)
AST SERPL-CCNC: 20 U/L
BASOPHILS # BLD: 0 % (ref 0–2)
BASOPHILS ABSOLUTE: 0 K/UL (ref 0–0.2)
BILIRUB SERPL-MCNC: 0.47 MG/DL (ref 0.3–1.2)
BUN BLDV-MCNC: 15 MG/DL (ref 8–23)
BUN/CREAT BLD: 16 (ref 9–20)
CALCIUM SERPL-MCNC: 9.5 MG/DL (ref 8.6–10.4)
CHLORIDE BLD-SCNC: 105 MMOL/L (ref 98–107)
CHOLESTEROL/HDL RATIO: 3.2
CHOLESTEROL: 121 MG/DL
CO2: 24 MMOL/L (ref 20–31)
CREAT SERPL-MCNC: 0.91 MG/DL (ref 0.7–1.2)
DIFFERENTIAL TYPE: YES
EOSINOPHILS RELATIVE PERCENT: 2 % (ref 0–5)
GFR AFRICAN AMERICAN: >60 ML/MIN
GFR NON-AFRICAN AMERICAN: >60 ML/MIN
GFR SERPL CREATININE-BSD FRML MDRD: ABNORMAL ML/MIN/{1.73_M2}
GFR SERPL CREATININE-BSD FRML MDRD: ABNORMAL ML/MIN/{1.73_M2}
GLUCOSE BLD-MCNC: 108 MG/DL (ref 70–99)
HCT VFR BLD CALC: 44.4 % (ref 41–53)
HDLC SERPL-MCNC: 38 MG/DL
HEMOGLOBIN: 15.2 G/DL (ref 13.5–17.5)
IMMATURE GRANULOCYTES: NORMAL %
LDL CHOLESTEROL: 71 MG/DL (ref 0–130)
LYMPHOCYTES # BLD: 21 % (ref 13–44)
MAGNESIUM: 2.2 MG/DL (ref 1.6–2.6)
MCH RBC QN AUTO: 31.8 PG (ref 26–34)
MCHC RBC AUTO-ENTMCNC: 34.2 G/DL (ref 31–37)
MCV RBC AUTO: 92.9 FL (ref 80–100)
MONOCYTES # BLD: 7 % (ref 5–9)
NRBC AUTOMATED: NORMAL PER 100 WBC
PATIENT FASTING?: YES
PDW BLD-RTO: 13.3 % (ref 12.1–15.2)
PLATELET # BLD: 182 K/UL (ref 140–450)
PLATELET ESTIMATE: NORMAL
PMV BLD AUTO: NORMAL FL (ref 6–12)
POTASSIUM SERPL-SCNC: 4.4 MMOL/L (ref 3.7–5.3)
RBC # BLD: 4.78 M/UL (ref 4.5–5.9)
RBC # BLD: NORMAL 10*6/UL
SEG NEUTROPHILS: 70 % (ref 39–75)
SEGMENTED NEUTROPHILS ABSOLUTE COUNT: 4.2 K/UL (ref 2.1–6.5)
SODIUM BLD-SCNC: 137 MMOL/L (ref 135–144)
TOTAL PROTEIN: 7.1 G/DL (ref 6.4–8.3)
TRIGL SERPL-MCNC: 59 MG/DL
TSH SERPL DL<=0.05 MIU/L-ACNC: 1.21 MIU/L (ref 0.3–5)
VITAMIN D 25-HYDROXY: 31.1 NG/ML (ref 30–100)
VLDLC SERPL CALC-MCNC: ABNORMAL MG/DL (ref 1–30)
WBC # BLD: 6 K/UL (ref 3.5–11)
WBC # BLD: NORMAL 10*3/UL

## 2020-08-18 PROCEDURE — 82306 VITAMIN D 25 HYDROXY: CPT

## 2020-08-18 PROCEDURE — 93017 CV STRESS TEST TRACING ONLY: CPT

## 2020-08-18 PROCEDURE — 99214 OFFICE O/P EST MOD 30 MIN: CPT | Performed by: INTERNAL MEDICINE

## 2020-08-18 PROCEDURE — 84443 ASSAY THYROID STIM HORMONE: CPT

## 2020-08-18 PROCEDURE — 80053 COMPREHEN METABOLIC PANEL: CPT

## 2020-08-18 PROCEDURE — 83735 ASSAY OF MAGNESIUM: CPT

## 2020-08-18 PROCEDURE — 71046 X-RAY EXAM CHEST 2 VIEWS: CPT

## 2020-08-18 PROCEDURE — 36415 COLL VENOUS BLD VENIPUNCTURE: CPT

## 2020-08-18 PROCEDURE — 80061 LIPID PANEL: CPT

## 2020-08-18 PROCEDURE — 85025 COMPLETE CBC W/AUTO DIFF WBC: CPT

## 2020-08-18 NOTE — LETTER
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 No restrictions with driving. If you have any questions or concerns, please don't hesitate to call.     Sincerely,        Nisha Liriano MD

## 2020-08-18 NOTE — PROCEDURES
[place] [address]                              CARDIAC STRESS TEST    PATIENT NAME: Harris Mari                    :        1960  MED REC NO:   [id number]                              ROOM:  ACCOUNT NO:   [de-identified]                           ADMIT DATE: 2020  PROVIDER:     Jonas Hernandez      DATE OF STUDY:  2020    TREADMILL STRESS TEST    REASON FOR TEST:  DOT physical.    The patient exercised for 6 minutes on accelerated Ruy protocol  achieving 9.9 METs. His underlying rhythm was atrial fibrillation with  a controlled ventricular response, got his heart rate up to 169 which is  100% maximum predicted heart rate. He has no chest pain, no ST  depression. Overall negative, asymptomatic stress test with good  exercise capacity. No EKG changes to indicate ischemia. This is  overall a normal treadmill cardiac stress test with good exercise  capacity.         Julieth Rosado    D: 2020 13:59:41       T: 2020 14:12:30     [id number]  Job#: [id number]     Doc#: [id number]    CC:

## 2020-08-18 NOTE — PROGRESS NOTES
Ov Dr. Gracy Merchant for one year follow up  Here to review all same day testing   Stress/labs/ekg/cxr   Bedside echo done      Will check prices of Xarelto/Pradaxa/Eliquis/Coumadin    Will refer for sleep study ([facility])    Will set up for event monitor     Follow up in 4 weeks with cardioversion    Will set up for echo  in 4 weeks      Ashly Lowry M.D. [address] [address]  [phone]        2020        Harveyrachel Gilbert, [address] [facility], [address] [place] [address]    RE:   Blanca Clark  :  1960    Dear Dr. Geovani Gutiérrez:    CHIEF COMPLAINT:  1. Coronary artery disease. 2.  Mild sick sinus syndrome. 3.  Asymptomatic with no chest pain, loss of energy or shortness of breath. HISTORY OF PRESENT ILLNESS:  I saw Mr. Castillo in the office on 2020. He is a pleasant 42-year-old gentleman, who I met on 2015, because of shortness of breath and severe hypertension and chest pain. He had a stress test, which was abnormal.  An echocardiogram on 2015, showed mild mitral regurgitation, EF of 55%. I did a catheterization on 2015, that showed 70% disease in the proximal LAD with an FFR of 0.81, with 40% disease in the right coronary artery and circumflex, EF of 60%. He continued to have discomfort, and therefore, I brought him back on 2015, and placed a 2.75 x 33 mm drug-eluting Xience in the LAD with a good end result. He has had no further cardiac catheterizations. I see him once a year. He does have mild sick sinus syndrome with heart rates in the 40s and 50s historically. When I saw him 1 year ago, I decreased his Coreg to 3.125 mg once a day from twice a day. He has had no hospitalizations or procedures since I saw him 1 year ago. His energy level is good. He has had no shortness of breath. No chest pain. No palpitations. No syncope or near syncope, lightheadedness or dizziness. He feels \"great\" as I see him today. 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    CARDIAC RISK FACTORS:  Known CAD:  Positive. Hypertension:  Positive. Hyperlipidemia:  Positive. Other Family Members:  Positive. Peripheral Vascular Disease:  Negative. Diabetes:  Negative. Smoking:  Negative. MEDICATIONS AT HOME:  He is currently on aspirin 81 mg daily, Lipitor 80 mg daily, Coreg 3.125 mg daily, lisinopril 10 mg half a tablet daily. PAST MEDICAL HISTORY:  1.  Palpitations. 2.  Hypertension in . 3.  Hyperlipidemia. 4.  Hernia repair 12 years ago on the right side, with left inguinal hernia repair on 2017. FAMILY HISTORY:  Mother had stents. Multiple aunts and uncles had stenting. Father  at 67. He has 7 brothers and sisters. SOCIAL HISTORY:  He is 61years old. Son, 34, with drug and alcohol use, has been sober for 4 years. He works in the [employer] and going to school in [place] to become a counselor. He does not smoke or drink alcohol. Does not exercise. Stays active. 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 REVIEW OF SYSTEMS:  Cardiac as above. Other systems reviewed including constitutional, eyes, ears, nose and throat, cardiovascular, respiratory, GI, , musculoskeletal, integumentary, neurologic, endocrine, hematologic and allergic/immunologic, which were all negative except for what is described above. No weight loss or weight gain. No change in bowel habits, no blood in stools. No fevers, sweats or chills. He does have a snoring history, although he has never had a sleep study. PHYSICAL EXAMINATION:  VITAL SIGNS:   His blood pressure was 136/70 with a heart rate of 80 and irregularly irregular. Respiratory rate 18. O2 sat was 97%. Weight 203 pounds. GENERAL:  He is a pleasant 40-year-old gentleman. Denied pain. He was oriented to person, place and time. Answered questions appropriately.   SKIN:  No unusual skin changes. HEENT:  The pupils are equally round and intact. Mucous membranes were dry. NECK:  No JVD. Good carotid pulses. No carotid bruits. No lymphadenopathy or thyromegaly. CARDIOVASCULAR EXAM:  S1 and S2 were normal.  No S3 or S4. He was irregularly irregular. Soft systolic blowing type murmur. No diastolic murmur. LUNGS:  Quite clear to auscultation and percussion. ABDOMEN:  Soft and nontender. Good bowel sounds. The aorta was not enlarged. No hepatomegaly, splenomegaly. EXTREMITIES:  Good femoral pulses. Good pedal pulses. No pedal edema. Skin was warm and dry. No calf tenderness. Nail beds pink. Good cap refill. PULSES:  Bilateral symmetrical radial, brachial and carotid pulses. No carotid bruits. Good femoral and pedal pulses. NEUROLOGIC EXAM:  Within normal limits. PSYCHIATRIC EXAM:  Within normal limits. LABORATORY DATA:  Today, sodium was 137, potassium 4.4, BUN 15, creatinine 0.91, GFR greater than 60, magnesium was 2.2, glucose 108, calcium was 9.5. His ALT was 25, AST was 20. Glucose was 108. TSH 1.21. White count 6.0 with hemoglobin of 15.2 and platelet count 660,377. We did a cardiac stress test today. He exercised 6 minutes on accelerated Ruy protocol, achieving 10 METs. His peak heart rate was 169, which is 100% of maximum predicted heart rate. He was in atrial fibrillation before, during and after the stress test.  He had no ST depression. No chest pain. It was a normal cardiac stress test with a good exercise capacity with no evidence of ischemia either by symptoms or by EKG criteria. Resting EKG showed atrial fibrillation with a controlled ventricular response. This was a new finding for the atrial fibrillation. Bedside echocardiogram showed normal LV size and function. His right-sided chambers seemed mildly enlarged.     IMPRESSION:  1.  New onset of atrial fibrillation by EKG done today, with him being totally asymptomatic and with his heart rate very well controlled. 2.  Normal treadmill cardiac stress test, with no chest pain or ST depression, with a good exercise capacity achieving 10 METs. 3.  Normal LV function by bedside echocardiogram, with slightly dilated right-sided chambers, most likely from occult sleep apnea. 4.  Coronary artery disease, status post catheterization on 12/09/2015, that showed 70% LAD, 40% circumflex and right coronary artery, normal EF of 60%. 5.  Angioplasty of the LAD on 12/16/2015, placing 2.75 x 33 mm drug-eluting Xience stent. 6.  Hyperlipidemia, under excellent control, with lipids pending. 7.  Hypertension, under good control. 8.  Family history of coronary artery disease. 9.  History of mild sick sinus syndrome, currently on Coreg 3.125 mg daily. PLAN:  1. We will start Xarelto 20 mg daily. 2.  We will send him for a sleep study because of his mildly dilated right-sided chambers and because of his snoring history and because of his new onset of atrial fibrillation and daytime somnolence. 3.  We will see in 4 weeks for a cardioversion. 4.  We will do an echocardiogram after we do the cardioversion. 5.  We will give him an event recorder after we do the cardioversion. 6.  He is cleared from a cardiac standpoint for his DOT physical with no restrictions as he is totally asymptomatic. DISCUSSION:  Mr. Gin Lee has been totally asymptomatic. He has had no chest pain, loss of energy, shortness of breath or palpitations. He, however, is in atrial fibrillation as we see him today. This is a new finding. His rate is very well controlled, and therefore, I am not surprised that this is totally asymptomatic. His stress test showed good exercise capacity, achieving 10 METs, with no chest pain or ST depression. He will need to be anticoagulated as he has a CHADS score of 2.   We have given him samples of Xarelto and he will check the cost of Pradaxa, Xarelto and Eliquis, to see if they are financially feasible. He does have a snoring history with some daytime somnolence. With his right-sided chambers being mildly enlarged and his new onset of atrial fibrillation, I will do a sleep study to see if he has occult sleep apnea. This could be contributing to his atrial fibrillation. He will need to be anticoagulated long-term. Hopefully, we can use a NOAC if the insurance company will approve. We will try to cardiovert him in 4 weeks and we will do an echocardiogram after the cardioversion. Again, he is asymptomatic, and therefore, I would not place him on antiarrhythmics to try to control his atrial fibrillation. We could consider a very low dose of a calcium-channel blocker, such as low-dose Cardizem at 30 mg twice a day. We have to, however, watch carefully for further bradycardia. Again, there are no restrictions or limitations and he is cleared from a cardiac standpoint for his DOT physical.    Thank you very much for allowing me the privilege of seeing Mr. Castillo. If you have any questions on my thoughts, please do not hesitate to contact me.      Sincerely,        Avinash Rowan    D: 08/18/2020 12:58:04     T: 08/18/2020 13:06:24     [email or web address]  Job#: [id number]   Doc#: [id number]

## 2020-08-18 NOTE — PATIENT INSTRUCTIONS
Will check prices of Xarelto/Pradaxa/Eliquis/Coumadin    Will refer for sleep study ([facility])    Will set up for event monitor     Follow up in 4 weeks with cardioversion    Will set up for echo in [place] in 4 weeks

## 2020-09-01 ENCOUNTER — HOSPITAL ENCOUNTER (OUTPATIENT)
Dept: NON INVASIVE DIAGNOSTICS | Age: 60
Discharge: HOME | End: 2020-09-01
Payer: COMMERCIAL

## 2020-09-01 ENCOUNTER — HOSPITAL ENCOUNTER (OUTPATIENT)
Age: 60
Discharge: HOME | End: 2020-09-01
Payer: COMMERCIAL

## 2020-09-01 LAB
LV EF: 55 %
LVEF MODALITY: NORMAL

## 2020-09-01 PROCEDURE — 93005 ELECTROCARDIOGRAM TRACING: CPT

## 2020-09-01 PROCEDURE — 93306 TTE W/DOPPLER COMPLETE: CPT

## 2020-09-01 NOTE — PROGRESS NOTES
Cardioversion    He was in NSR on day of cardioversion, therefore, it was not done. He will continue Xarelto. No 30 day event recorder. He will have echo. Repeat ekg in 1 month.     Thanks, Heaven Castaneda MD

## 2020-09-01 NOTE — H&P
I had the pleasure of seeing Mr. Castillo in the office on 08/18/2020. He is a pleasant 51-year-old gentleman who I met on 11/19/2015, because of shortness of breath and severe hypertension and also chest pain. He had a stress test, which was abnormal, and echocardiogram on 12/01/2015, that showed mild mitral regurgitation, EF of 55%. I did a catheterization on 12/09/2015, that showed 70% disease in the proximal LAD with an FFR of 0.81 with 40% right coronary artery and circumflex disease, EF of 60%. He continued to have discomfort, and therefore, I brought him back for angioplasty on 12/16/2015, placing 2.75 x 33 mm drug-eluting Xience stent in the LAD with a good end result.     He has done well over the past year. His heart rate remains somewhat low in the high 40s and low 50s range. He has no lightheadedness or dizziness unless he squats down and stands up rapidly.      He has had no chest pain or chest discomfort. Energy level has been good. He has not been exercising on a regular basis but stays busy. Again, he has had no hospitalizations or procedures.     His son, who is 29, was a drug and alcohol user, and has been sober for 3 years. He is an apprentice in [employer], but is not going to school to be a psychologist to help other addicts. Mr. Zenda Dance does not smoke or drink alcohol. Does not exercise. Stays active. He is not working at this time. He has developed atrial fibrillation.     CARDIAC RISK FACTORS:  Known CAD:  Positive. Hypertension:  Positive. Hyperlipidemia:  Positive. Other Family Members:  Positive. Peripheral Vascular Disease:  Negative. Diabetes:  Negative. Smoking:  Negative.     MEDICATIONS AT THIS TIME:  He is on aspirin 81 mg daily, Lipitor 80 mg daily, Coreg 3.125 mg b.i.d., HydroDIURIL 12.5 mg daily, lisinopril 10 mg half a tablet daily.     PAST MEDICAL HISTORY:  1.  Palpitations. 2.  Hypertension in 2013. 3.  Hyperlipidemia.   4.  Hernia repair 11 years ago

## 2020-09-01 NOTE — PROGRESS NOTES
Rachel Jackson M.D. [address] [place] [address] [facility]   [phone]        2020        Nayely Rogel, [address] [facility], [address] [place] [address]    RE:   Marie Littlejohn  :  1960    Dear Dr. Jamie Fabry:    CHIEF COMPLAINT:  1. Coronary artery disease. 2.  Mild sick sinus syndrome. 3.  Asymptomatic with no chest pain, loss of energy or shortness of breath. HISTORY OF PRESENT ILLNESS:  I saw Mr. Castillo in the office on 2020. He is a pleasant 71-year-old gentleman, who I met on 2015, because of shortness of breath and severe hypertension and chest pain. He had a stress test, which was abnormal.  An echocardiogram on 2015, showed mild mitral regurgitation, EF of 55%. I did a catheterization on 2015, that showed 70% disease in the proximal LAD with an FFR of 0.81, with 40% disease in the right coronary artery and circumflex, EF of 60%. He continued to have discomfort, and therefore, I brought him back on 2015, and placed a 2.75 x 33 mm drug-eluting Xience in the LAD with a good end result. He has had no further cardiac catheterizations. I see him once a year. He does have mild sick sinus syndrome with heart rates in the 40s and 50s historically. When I saw him 1 year ago, I decreased his Coreg to 3.125 mg once a day from twice a day. He has had no hospitalizations or procedures since I saw him 1 year ago. His energy level is good. He has had no shortness of breath. No chest pain. No palpitations. No syncope or near syncope, lightheadedness or dizziness. He feels \"great\" as I see him today. 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    CARDIAC RISK FACTORS:  Known CAD:  Positive. Hypertension:  Positive. Hyperlipidemia:  Positive. Other Family Members:  Positive. Peripheral Vascular Disease:  Negative. Diabetes:  Negative.   Smoking: Negative. MEDICATIONS AT HOME:  He is currently on aspirin 81 mg daily, Lipitor 80 mg daily, Coreg 3.125 mg daily, lisinopril 10 mg half a tablet daily. PAST MEDICAL HISTORY:  1.  Palpitations. 2.  Hypertension in . 3.  Hyperlipidemia. 4.  Hernia repair 12 years ago on the right side, with left inguinal hernia repair on 2017. FAMILY HISTORY:  Mother had stents. Multiple aunts and uncles had stenting. Father  at 67. He has 7 brothers and sisters. SOCIAL HISTORY:  He is 61years old. Son, 34, with drug and alcohol use, has been sober for 4 years. He works in the [employer] and going to school in [place] to become a counselor. He does not smoke or drink alcohol. Does not exercise. Stays active. 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 REVIEW OF SYSTEMS:  Cardiac as above. Other systems reviewed including constitutional, eyes, ears, nose and throat, cardiovascular, respiratory, GI, , musculoskeletal, integumentary, neurologic, endocrine, hematologic and allergic/immunologic, which were all negative except for what is described above. No weight loss or weight gain. No change in bowel habits, no blood in stools. No fevers, sweats or chills. He does have a snoring history, although he has never had a sleep study. PHYSICAL EXAMINATION:  VITAL SIGNS:   His blood pressure was 136/70 with a heart rate of 80 and irregularly irregular. Respiratory rate 18. O2 sat was 97%. Weight 203 pounds. GENERAL:  He is a pleasant 80-year-old gentleman. Denied pain. He was oriented to person, place and time. Answered questions appropriately. SKIN:  No unusual skin changes. HEENT:  The pupils are equally round and intact. Mucous membranes were dry. NECK:  No JVD. Good carotid pulses. No carotid bruits. No lymphadenopathy or thyromegaly. CARDIOVASCULAR EXAM:  S1 and S2 were normal.  No S3 or S4. He was irregularly irregular.   Soft systolic blowing type murmur. No diastolic murmur. LUNGS:  Quite clear to auscultation and percussion. ABDOMEN:  Soft and nontender. Good bowel sounds. The aorta was not enlarged. No hepatomegaly, splenomegaly. EXTREMITIES:  Good femoral pulses. Good pedal pulses. No pedal edema. Skin was warm and dry. No calf tenderness. Nail beds pink. Good cap refill. PULSES:  Bilateral symmetrical radial, brachial and carotid pulses. No carotid bruits. Good femoral and pedal pulses. NEUROLOGIC EXAM:  Within normal limits. PSYCHIATRIC EXAM:  Within normal limits. LABORATORY DATA:  Today, sodium was 137, potassium 4.4, BUN 15, creatinine 0.91, GFR greater than 60, magnesium was 2.2, glucose 108, calcium was 9.5. His ALT was 25, AST was 20. Glucose was 108. TSH 1.21. White count 6.0 with hemoglobin of 15.2 and platelet count 146,955. We did a cardiac stress test today. He exercised 6 minutes on accelerated Ruy protocol, achieving 10 METs. His peak heart rate was 169, which is 100% of maximum predicted heart rate. He was in atrial fibrillation before, during and after the stress test.  He had no ST depression. No chest pain. It was a normal cardiac stress test with a good exercise capacity with no evidence of ischemia either by symptoms or by EKG criteria. Resting EKG showed atrial fibrillation with a controlled ventricular response. This was a new finding for the atrial fibrillation. Bedside echocardiogram showed normal LV size and function. His right-sided chambers seemed mildly enlarged. IMPRESSION:  1.  New onset of atrial fibrillation by EKG done today, with him being totally asymptomatic and with his heart rate very well controlled. 2.  Normal treadmill cardiac stress test, with no chest pain or ST depression, with a good exercise capacity achieving 10 METs. 3.  Normal LV function by bedside echocardiogram, with slightly dilated right-sided chambers, most likely from occult sleep apnea.   4.  Coronary artery disease, status post catheterization on 12/09/2015, that showed 70% LAD, 40% circumflex and right coronary artery, normal EF of 60%. 5.  Angioplasty of the LAD on 12/16/2015, placing 2.75 x 33 mm drug-eluting Xience stent. 6.  Hyperlipidemia, under excellent control, with lipids pending. 7.  Hypertension, under good control. 8.  Family history of coronary artery disease. 9.  History of mild sick sinus syndrome, currently on Coreg 3.125 mg daily. PLAN:  1. We will start Xarelto 20 mg daily. 2.  We will send him for a sleep study because of his mildly dilated right-sided chambers and because of his snoring history and because of his new onset of atrial fibrillation and daytime somnolence. 3.  We will see in 4 weeks for a cardioversion. 4.  We will do an echocardiogram after we do the cardioversion. 5.  We will give him an event recorder after we do the cardioversion. 6.  He is cleared from a cardiac standpoint for his DOT physical with no restrictions as he is totally asymptomatic. DISCUSSION:  Mr. Millicent Caldwell has been totally asymptomatic. He has had no chest pain, loss of energy, shortness of breath or palpitations. He, however, is in atrial fibrillation as we see him today. This is a new finding. His rate is very well controlled, and therefore, I am not surprised that this is totally asymptomatic. His stress test showed good exercise capacity, achieving 10 METs, with no chest pain or ST depression. He will need to be anticoagulated as he has a CHADS score of 2. We have given him samples of Xarelto and he will check the cost of Pradaxa, Xarelto and Eliquis, to see if they are financially feasible. He does have a snoring history with some daytime somnolence. With his right-sided chambers being mildly enlarged and his new onset of atrial fibrillation, I will do a sleep study to see if he has occult sleep apnea. This could be contributing to his atrial fibrillation.     He will need to be anticoagulated long-term. Hopefully, we can use a NOAC if the insurance company will approve. We will try to cardiovert him in 4 weeks and we will do an echocardiogram after the cardioversion. Again, he is asymptomatic, and therefore, I would not place him on antiarrhythmics to try to control his atrial fibrillation. We could consider a very low dose of a calcium-channel blocker, such as low-dose Cardizem at 30 mg twice a day. We have to, however, watch carefully for further bradycardia. Again, there are no restrictions or limitations and he is cleared from a cardiac standpoint for his DOT physical.    Thank you very much for allowing me the privilege of seeing Mr. Castillo. If you have any questions on my thoughts, please do not hesitate to contact me.      Sincerely,        Alea Braden    D: 08/18/2020 12:58:04     T: 08/18/2020 13:06:24     [email or web address]  Job#: [id number]   Doc#: [id number]

## 2020-09-01 NOTE — PROGRESS NOTES
Bret Davis M.D. [address] [place] [address]  [phone]        2020        [facility], [address] [place] [address], [address] [place] [address]    RE:   Moisés Hoffman  :  1960    Dear Dr. iWlton Grimm:    CHIEF COMPLAINT:  1. Coronary artery disease. 2.  Mild sick sinus syndrome. 3.  Asymptomatic with no chest pain, loss of energy or shortness of breath. HISTORY OF PRESENT ILLNESS:  I saw Mr. Castillo in the office on 2020. He is a pleasant 61-year-old gentleman, who I met on 2015, because of shortness of breath and severe hypertension and chest pain. He had a stress test, which was abnormal.  An echocardiogram on 2015, showed mild mitral regurgitation, EF of 55%. I did a catheterization on 2015, that showed 70% disease in the proximal LAD with an FFR of 0.81, with 40% disease in the right coronary artery and circumflex, EF of 60%. He continued to have discomfort, and therefore, I brought him back on 2015, and placed a 2.75 x 33 mm drug-eluting Xience in the LAD with a good end result. He has had no further cardiac catheterizations. I see him once a year. He does have mild sick sinus syndrome with heart rates in the 40s and 50s historically. When I saw him 1 year ago, I decreased his Coreg to 3.125 mg once a day from twice a day. He has had no hospitalizations or procedures since I saw him 1 year ago. His energy level is good. He has had no shortness of breath. No chest pain. No palpitations. No syncope or near syncope, lightheadedness or dizziness. He feels \"great\" as I see him today. 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    CARDIAC RISK FACTORS:  Known CAD:  Positive. Hypertension:  Positive. Hyperlipidemia:  Positive. Other Family Members:  Positive. Peripheral Vascular Disease:  Negative. Diabetes:  Negative.   Smoking: Negative. MEDICATIONS AT HOME:  He is currently on aspirin 81 mg daily, Lipitor 80 mg daily, Coreg 3.125 mg daily, lisinopril 10 mg half a tablet daily. PAST MEDICAL HISTORY:  1.  Palpitations. 2.  Hypertension in . 3.  Hyperlipidemia. 4.  Hernia repair 12 years ago on the right side, with left inguinal hernia repair on 2017. FAMILY HISTORY:  Mother had stents. Multiple aunts and uncles had stenting. Father  at 67. He has 7 brothers and sisters. SOCIAL HISTORY:  He is 61years old. Son, 34, with drug and alcohol use, has been sober for 4 years. He works in the [employer] and going to school in [place] to become a counselor. He does not smoke or drink alcohol. Does not exercise. Stays active. 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 REVIEW OF SYSTEMS:  Cardiac as above. Other systems reviewed including constitutional, eyes, ears, nose and throat, cardiovascular, respiratory, GI, , musculoskeletal, integumentary, neurologic, endocrine, hematologic and allergic/immunologic, which were all negative except for what is described above. No weight loss or weight gain. No change in bowel habits, no blood in stools. No fevers, sweats or chills. He does have a snoring history, although he has never had a sleep study. PHYSICAL EXAMINATION:  VITAL SIGNS:   His blood pressure was 136/70 with a heart rate of 80 and irregularly irregular. Respiratory rate 18. O2 sat was 97%. Weight 203 pounds. GENERAL:  He is a pleasant 57-year-old gentleman. Denied pain. He was oriented to person, place and time. Answered questions appropriately. SKIN:  No unusual skin changes. HEENT:  The pupils are equally round and intact. Mucous membranes were dry. NECK:  No JVD. Good carotid pulses. No carotid bruits. No lymphadenopathy or thyromegaly. CARDIOVASCULAR EXAM:  S1 and S2 were normal.  No S3 or S4. He was irregularly irregular.   Soft systolic blowing type murmur. No diastolic murmur. LUNGS:  Quite clear to auscultation and percussion. ABDOMEN:  Soft and nontender. Good bowel sounds. The aorta was not enlarged. No hepatomegaly, splenomegaly. EXTREMITIES:  Good femoral pulses. Good pedal pulses. No pedal edema. Skin was warm and dry. No calf tenderness. Nail beds pink. Good cap refill. PULSES:  Bilateral symmetrical radial, brachial and carotid pulses. No carotid bruits. Good femoral and pedal pulses. NEUROLOGIC EXAM:  Within normal limits. PSYCHIATRIC EXAM:  Within normal limits. LABORATORY DATA:  Today, sodium was 137, potassium 4.4, BUN 15, creatinine 0.91, GFR greater than 60, magnesium was 2.2, glucose 108, calcium was 9.5. His ALT was 25, AST was 20. Glucose was 108. TSH 1.21. White count 6.0 with hemoglobin of 15.2 and platelet count 587,000. We did a cardiac stress test today. He exercised 6 minutes on accelerated Ruy protocol, achieving 10 METs. His peak heart rate was 169, which is 100% of maximum predicted heart rate. He was in atrial fibrillation before, during and after the stress test.  He had no ST depression. No chest pain. It was a normal cardiac stress test with a good exercise capacity with no evidence of ischemia either by symptoms or by EKG criteria. Resting EKG showed atrial fibrillation with a controlled ventricular response. This was a new finding for the atrial fibrillation. Bedside echocardiogram showed normal LV size and function. His right-sided chambers seemed mildly enlarged. IMPRESSION:  1.  New onset of atrial fibrillation by EKG done today, with him being totally asymptomatic and with his heart rate very well controlled. 2.  Normal treadmill cardiac stress test, with no chest pain or ST depression, with a good exercise capacity achieving 10 METs. 3.  Normal LV function by bedside echocardiogram, with slightly dilated right-sided chambers, most likely from occult sleep apnea.   4.  Coronary artery disease, status post catheterization on 12/09/2015, that showed 70% LAD, 40% circumflex and right coronary artery, normal EF of 60%. 5.  Angioplasty of the LAD on 12/16/2015, placing 2.75 x 33 mm drug-eluting Xience stent. 6.  Hyperlipidemia, under excellent control, with lipids pending. 7.  Hypertension, under good control. 8.  Family history of coronary artery disease. 9.  History of mild sick sinus syndrome, currently on Coreg 3.125 mg daily. PLAN:  1. We will start Xarelto 20 mg daily. 2.  We will send him for a sleep study because of his mildly dilated right-sided chambers and because of his snoring history and because of his new onset of atrial fibrillation and daytime somnolence. 3.  We will see in 4 weeks for a cardioversion. 4.  We will do an echocardiogram after we do the cardioversion. 5.  We will give him an event recorder after we do the cardioversion. 6.  He is cleared from a cardiac standpoint for his DOT physical with no restrictions as he is totally asymptomatic. DISCUSSION:  Mr. Courtney Cruz has been totally asymptomatic. He has had no chest pain, loss of energy, shortness of breath or palpitations. He, however, is in atrial fibrillation as we see him today. This is a new finding. His rate is very well controlled, and therefore, I am not surprised that this is totally asymptomatic. His stress test showed good exercise capacity, achieving 10 METs, with no chest pain or ST depression. He will need to be anticoagulated as he has a CHADS score of 2. We have given him samples of Xarelto and he will check the cost of Pradaxa, Xarelto and Eliquis, to see if they are financially feasible. He does have a snoring history with some daytime somnolence. With his right-sided chambers being mildly enlarged and his new onset of atrial fibrillation, I will do a sleep study to see if he has occult sleep apnea. This could be contributing to his atrial fibrillation.     He will need to be anticoagulated long-term. Hopefully, we can use a NOAC if the insurance company will approve. We will try to cardiovert him in 4 weeks and we will do an echocardiogram after the cardioversion. Again, he is asymptomatic, and therefore, I would not place him on antiarrhythmics to try to control his atrial fibrillation. We could consider a very low dose of a calcium-channel blocker, such as low-dose Cardizem at 30 mg twice a day. We have to, however, watch carefully for further bradycardia. Again, there are no restrictions or limitations and he is cleared from a cardiac standpoint for his DOT physical.    Thank you very much for allowing me the privilege of seeing Mr. Castillo. If you have any questions on my thoughts, please do not hesitate to contact me.      Sincerely,        Chalo Florez    D: 08/18/2020 12:58:04     T: 08/18/2020 13:06:24     [email or web address]  Job#: [id number]   Doc#: [id number]

## 2020-09-02 LAB
EKG ATRIAL RATE: 45 BPM
EKG P AXIS: 72 DEGREES
EKG P-R INTERVAL: 186 MS
EKG Q-T INTERVAL: 460 MS
EKG QRS DURATION: 96 MS
EKG QTC CALCULATION (BAZETT): 397 MS
EKG R AXIS: 73 DEGREES
EKG T AXIS: 68 DEGREES
EKG VENTRICULAR RATE: 45 BPM

## 2020-09-02 PROCEDURE — 93010 ELECTROCARDIOGRAM REPORT: CPT | Performed by: INTERNAL MEDICINE

## 2020-09-09 ENCOUNTER — TELEPHONE (OUTPATIENT)
Dept: CARDIOLOGY CLINIC | Age: 60
End: 2020-09-09

## 2020-11-19 RX ORDER — LISINOPRIL 5 MG/1
5 TABLET ORAL DAILY
Qty: 90 TABLET | Refills: 3 | Status: SHIPPED | OUTPATIENT
Start: 2020-11-19 | End: 2020-11-23 | Stop reason: SDUPTHER

## 2020-11-19 RX ORDER — LISINOPRIL 10 MG/1
TABLET ORAL
Qty: 45 TABLET | Refills: 2 | Status: CANCELLED | OUTPATIENT
Start: 2020-11-19

## 2020-11-19 NOTE — TELEPHONE ENCOUNTER
Spoke with Dr. Vero Jacques. Patient is to continue taking his Lisinopril 5mg daily. Will send over new Rx for the 5mg tabs so he will not have to split tabs anymore.   Patient was notified

## 2020-11-19 NOTE — TELEPHONE ENCOUNTER
Lisinopril requested      Patient spoke to pharmacy and they told him he was discontinued on this medication , he states he was unaware of this and would like clarification      Pending       Please advise

## 2020-11-23 RX ORDER — LISINOPRIL 5 MG/1
5 TABLET ORAL DAILY
Qty: 90 TABLET | Refills: 3 | Status: SHIPPED | OUTPATIENT
Start: 2020-11-23 | End: 2020-12-04 | Stop reason: SDUPTHER

## 2020-12-04 RX ORDER — LISINOPRIL 5 MG/1
5 TABLET ORAL DAILY
Qty: 90 TABLET | Refills: 3 | Status: SHIPPED | OUTPATIENT
Start: 2020-12-04 | End: 2022-01-04

## 2021-07-20 RX ORDER — ATORVASTATIN CALCIUM 80 MG/1
TABLET, FILM COATED ORAL
Qty: 90 TABLET | Refills: 3 | Status: SHIPPED | OUTPATIENT
Start: 2021-07-20 | End: 2022-07-15

## 2021-08-05 DIAGNOSIS — Z95.820 S/P ANGIOPLASTY WITH STENT: ICD-10-CM

## 2021-08-05 DIAGNOSIS — E78.5 HYPERLIPIDEMIA, UNSPECIFIED HYPERLIPIDEMIA TYPE: ICD-10-CM

## 2021-08-05 DIAGNOSIS — I10 ESSENTIAL HYPERTENSION: Primary | ICD-10-CM

## 2021-08-05 DIAGNOSIS — E55.9 VITAMIN D DEFICIENCY DISEASE: ICD-10-CM

## 2021-09-21 RX ORDER — RIVAROXABAN 20 MG/1
TABLET, FILM COATED ORAL
Qty: 90 TABLET | Refills: 6 | Status: SHIPPED | OUTPATIENT
Start: 2021-09-21 | End: 2021-10-06

## 2021-10-06 ENCOUNTER — HOSPITAL ENCOUNTER (OUTPATIENT)
Age: 61
Discharge: HOME | End: 2021-10-06
Payer: COMMERCIAL

## 2021-10-06 ENCOUNTER — HOSPITAL ENCOUNTER (OUTPATIENT)
Age: 61
Discharge: HOME | End: 2021-10-08
Payer: COMMERCIAL

## 2021-10-06 ENCOUNTER — HOSPITAL ENCOUNTER (OUTPATIENT)
Dept: GENERAL RADIOLOGY | Age: 61
Discharge: HOME | End: 2021-10-08
Payer: COMMERCIAL

## 2021-10-06 ENCOUNTER — OFFICE VISIT (OUTPATIENT)
Dept: CARDIOLOGY CLINIC | Age: 61
End: 2021-10-06
Payer: COMMERCIAL

## 2021-10-06 VITALS
SYSTOLIC BLOOD PRESSURE: 140 MMHG | WEIGHT: 201 LBS | HEART RATE: 68 BPM | OXYGEN SATURATION: 98 % | DIASTOLIC BLOOD PRESSURE: 80 MMHG | BODY MASS INDEX: 28.84 KG/M2

## 2021-10-06 DIAGNOSIS — I10 ESSENTIAL HYPERTENSION: ICD-10-CM

## 2021-10-06 DIAGNOSIS — I10 ESSENTIAL HYPERTENSION: Primary | ICD-10-CM

## 2021-10-06 DIAGNOSIS — E78.5 HYPERLIPIDEMIA, UNSPECIFIED HYPERLIPIDEMIA TYPE: ICD-10-CM

## 2021-10-06 DIAGNOSIS — Z95.820 S/P ANGIOPLASTY WITH STENT: ICD-10-CM

## 2021-10-06 DIAGNOSIS — E55.9 VITAMIN D DEFICIENCY DISEASE: ICD-10-CM

## 2021-10-06 LAB
ABSOLUTE EOS #: 0.1 K/UL (ref 0–0.4)
ABSOLUTE IMMATURE GRANULOCYTE: ABNORMAL K/UL (ref 0–0.3)
ABSOLUTE LYMPH #: 0.9 K/UL (ref 1–4.8)
ABSOLUTE MONO #: 0.5 K/UL (ref 0–1)
ALBUMIN SERPL-MCNC: 4.2 G/DL (ref 3.5–5.2)
ALBUMIN/GLOBULIN RATIO: ABNORMAL (ref 1–2.5)
ALP BLD-CCNC: 71 U/L (ref 40–129)
ALT SERPL-CCNC: 19 U/L (ref 5–41)
ANION GAP SERPL CALCULATED.3IONS-SCNC: 8 MMOL/L (ref 9–17)
AST SERPL-CCNC: 19 U/L
BASOPHILS # BLD: 0 % (ref 0–2)
BASOPHILS ABSOLUTE: 0 K/UL (ref 0–0.2)
BILIRUB SERPL-MCNC: 0.67 MG/DL (ref 0.3–1.2)
BUN BLDV-MCNC: 17 MG/DL (ref 8–23)
BUN/CREAT BLD: 20 (ref 9–20)
CALCIUM SERPL-MCNC: 9.2 MG/DL (ref 8.6–10.4)
CHLORIDE BLD-SCNC: 106 MMOL/L (ref 98–107)
CHOLESTEROL/HDL RATIO: 2.9
CHOLESTEROL: 126 MG/DL
CO2: 26 MMOL/L (ref 20–31)
CREAT SERPL-MCNC: 0.83 MG/DL (ref 0.7–1.2)
DIFFERENTIAL TYPE: YES
EKG ATRIAL RATE: 416 BPM
EKG Q-T INTERVAL: 422 MS
EKG QRS DURATION: 86 MS
EKG QTC CALCULATION (BAZETT): 422 MS
EKG R AXIS: 80 DEGREES
EKG T AXIS: 74 DEGREES
EKG VENTRICULAR RATE: 60 BPM
EOSINOPHILS RELATIVE PERCENT: 1 % (ref 0–5)
GFR AFRICAN AMERICAN: >60 ML/MIN
GFR NON-AFRICAN AMERICAN: >60 ML/MIN
GFR SERPL CREATININE-BSD FRML MDRD: ABNORMAL ML/MIN/{1.73_M2}
GFR SERPL CREATININE-BSD FRML MDRD: ABNORMAL ML/MIN/{1.73_M2}
GLUCOSE BLD-MCNC: 103 MG/DL (ref 70–99)
HCT VFR BLD CALC: 40.1 % (ref 41–53)
HDLC SERPL-MCNC: 43 MG/DL
HEMOGLOBIN: 13.9 G/DL (ref 13.5–17.5)
IMMATURE GRANULOCYTES: ABNORMAL %
LDL CHOLESTEROL: 73 MG/DL (ref 0–130)
LYMPHOCYTES # BLD: 17 % (ref 13–44)
MAGNESIUM: 2.1 MG/DL (ref 1.6–2.6)
MCH RBC QN AUTO: 32.2 PG (ref 26–34)
MCHC RBC AUTO-ENTMCNC: 34.6 G/DL (ref 31–37)
MCV RBC AUTO: 92.9 FL (ref 80–100)
MONOCYTES # BLD: 8 % (ref 5–9)
NRBC AUTOMATED: ABNORMAL PER 100 WBC
PATIENT FASTING?: YES
PDW BLD-RTO: 13.7 % (ref 12.1–15.2)
PLATELET # BLD: 191 K/UL (ref 140–450)
PLATELET ESTIMATE: ABNORMAL
PMV BLD AUTO: ABNORMAL FL (ref 6–12)
POTASSIUM SERPL-SCNC: 4.4 MMOL/L (ref 3.7–5.3)
RBC # BLD: 4.31 M/UL (ref 4.5–5.9)
RBC # BLD: ABNORMAL 10*6/UL
SEG NEUTROPHILS: 74 % (ref 39–75)
SEGMENTED NEUTROPHILS ABSOLUTE COUNT: 4.1 K/UL (ref 2.1–6.5)
SODIUM BLD-SCNC: 140 MMOL/L (ref 135–144)
TOTAL PROTEIN: 6.5 G/DL (ref 6.4–8.3)
TRIGL SERPL-MCNC: 51 MG/DL
TSH SERPL DL<=0.05 MIU/L-ACNC: 1.16 MIU/L (ref 0.3–5)
VITAMIN D 25-HYDROXY: 28.6 NG/ML (ref 30–100)
VLDLC SERPL CALC-MCNC: NORMAL MG/DL (ref 1–30)
WBC # BLD: 5.6 K/UL (ref 3.5–11)
WBC # BLD: ABNORMAL 10*3/UL

## 2021-10-06 PROCEDURE — 84443 ASSAY THYROID STIM HORMONE: CPT

## 2021-10-06 PROCEDURE — 99214 OFFICE O/P EST MOD 30 MIN: CPT | Performed by: INTERNAL MEDICINE

## 2021-10-06 PROCEDURE — 71046 X-RAY EXAM CHEST 2 VIEWS: CPT

## 2021-10-06 PROCEDURE — 93010 ELECTROCARDIOGRAM REPORT: CPT | Performed by: INTERNAL MEDICINE

## 2021-10-06 PROCEDURE — 80061 LIPID PANEL: CPT

## 2021-10-06 PROCEDURE — 82306 VITAMIN D 25 HYDROXY: CPT

## 2021-10-06 PROCEDURE — 85025 COMPLETE CBC W/AUTO DIFF WBC: CPT

## 2021-10-06 PROCEDURE — 36415 COLL VENOUS BLD VENIPUNCTURE: CPT

## 2021-10-06 PROCEDURE — 80053 COMPREHEN METABOLIC PANEL: CPT

## 2021-10-06 PROCEDURE — 93005 ELECTROCARDIOGRAM TRACING: CPT

## 2021-10-06 PROCEDURE — 83735 ASSAY OF MAGNESIUM: CPT

## 2021-10-06 NOTE — PROGRESS NOTES
Ov DR Sharyn Gregory 1 year follow up  Pt extremely anxious   Felt fine this am then came in   Had EKG in a fib  Explained he is protected on   xarelto   But upset not feeling any sx  Felt them last year when he   Was in a fib. No chest pain or sob. No hospitalizations or procedures. Son Is 32 finishing college   Working with youth drug and alcohol  Had COVID did get Casirivimab -imdevimab  Infusion. Bedside echo done. Will see in 1 year.

## 2021-10-11 NOTE — PROGRESS NOTES
Meliton Nelson M.D. [address] [place] [address] [place] [address]  [phone]        2021        Deborah Morris, [address]    RE:   Elias Ferrer  :  1960    Dear Dr. Sosa Albright:    CHIEF COMPLAINT:  Paroxysmal atrial fibrillation. HISTORY OF PRESENT ILLNESS:  I had the pleasure of seeing Mr. Castillo in the office on 10/06/2021. He is a pleasant 80-year-old gentleman who I met on 2015, because of shortness of breath and severe hypertension and chest pain. He had a stress test, which was abnormal.  An echocardiogram on 2015, showed mild mitral regurgitation, EF of 55%. I did a catheterization on 2015, that showed 70% disease in the proximal LAD with an FFR of 0.81, with 40% disease in the right coronary artery and circumflex, EF of 60%. He continued to have chest discomfort. Therefore, I brought him back on 2015, and placed a 2.75 x 33 mm drug-eluting Xience in the LAD with a good end result. He does have mild sick sinus syndrome, which has not needed a pacemaker. He had atrial fibrillation discovered last year when I saw him for a regular appointment. He was totally asymptomatic and his heart rate was controlled. I did do a stress test, which was normal.  He had a bedside echocardiogram that showed mildly dilated right-sided chambers. We brought him in for cardioversion, but he had converted back to sinus rhythm. He has done well over the past year. He has had no chest pain or chest discomfort, no unusual loss of energy, no shortness of breath. He has had no hospitalizations or procedures. He had done an EKG and blood work today. His EKG showed that he was in atrial fibrillation again. He is again totally asymptomatic. CARDIAC RISK FACTORS:  Known CAD:  Positive. Hypertension:  Positive. Hyperlipidemia:  Positive.   Other Family Members: Positive. Peripheral Vascular Disease:  Negative. Diabetes:  Negative. Smoking:  Negative. MEDICATIONS AT HOME:  He is currently on aspirin 81 mg daily, Lipitor 80 mg daily, Coreg 3.125 mg daily, lisinopril 5 mg daily, Xarelto 20 mg daily. PAST MEDICAL AND SURGICAL HISTORY:  1.  Palpitations. 2.  Hypertension since . 3.  Hyperlipidemia. 4.  Hernia repair 13 years ago on the right side. 5.  Left inguinal hernia repair on 2017. 6.  Cardiac as described above, status post angioplasty of his LAD in . FAMILY HISTORY:  Mother had stents. Multiple aunts and uncles had stents. Father  at 67. Seven brothers and sister. SOCIAL HISTORY:  He is 64years old. Son is 32years old, who is finishing college, working with youth, drug and alcohol addicts, he wants to become a counselor. He does not smoke or drink alcohol. He does not exercise. He does stay active. He is a friend of Diego Kumar. 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 REVIEW OF SYSTEMS:  Cardiac as above. Other systems reviewed including constitutional, eyes, ears, nose and throat, cardiovascular, respiratory, GI, , musculoskeletal, integumentary, neurologic, endocrine, hematologic and allergic/immunologic are negative except for what is described above. No weight loss or weight gain. No change in bowel habits. No blood in stools. No fevers, sweats or chills. PHYSICAL EXAMINATION:  VITAL SIGNS:  His blood pressure was 140/80 with a heart rate of 68 and irregular. Respiratory rate 18. O2 sat 98%. Weight 201 pounds. GENERAL:  He is a pleasant 72-year-old gentleman. Denied pain. He was oriented to person, place and time. Answered questions appropriately. SKIN:  No unusual skin changes. HEENT:  The pupils are equally round and intact. Mucous membranes were dry. NECK:  No JVD. Good carotid pulses. No carotid bruits. No lymphadenopathy or thyromegaly.   CARDIOVASCULAR EXAM:  S1 and S2 were normal.  No S3 or S4. Soft systolic blowing type murmur. No diastolic murmur. PMI was normal.  No lift, thrust, or pericardial friction rub. LUNGS:  Clear to auscultation and percussion. ABDOMEN:  Soft and nontender. Good bowel sounds. EXTREMITIES:  Good femoral pulses. Good pedal pulses. No pedal edema. Skin was warm and dry. No calf tenderness. Nail beds pink. Good cap refill. PULSES:  Bilateral symmetrical radial, brachial and carotid pulses. No carotid bruits. Good femoral and pedal pulses. NEUROLOGIC EXAM:  Within normal limits. PSYCHIATRIC EXAM:  Within normal limits. LABORATORY DATA:  His sodium was 140, potassium 4.4, BUN 17, creatinine 0.83, GFR greater than 60, magnesium 2.1, glucose 103, calcium was 9.2. Cholesterol 126, HDL 43, LDL 73, triglycerides 51. ALT was 19, AST was 19. Glucose 103. TSH was 1. 16. Vitamin D 28.6. White count 5.6, hemoglobin 13.9 with a platelet count 765,581. EKG showed atrial fibrillation with a controlled ventricular response, with nonspecific ST changes. Chest x-ray was unremarkable. IMPRESSION:  1. History of paroxysmal atrial fibrillation, discovered last year, with him spontaneously reverting to sinus rhythm, with him again in atrial fibrillation on today's EKG being totally asymptomatic with a controlled heart rate. 2.  Normal treadmill stress test one year ago with good exercise capacity. 3.  Normal LV function by bedside echocardiogram, with slightly dilated right-sided chambers. 4.  Coronary artery disease, status post catheterization on 12/09/2015, showed 70% LAD, 40% circumflex and right coronary artery, EF of 60%. 5.  Angioplasty of the LAD on 12/16/2015, placing 2.75 x 33 mm drug-eluting Xience stent. 6.  Hyperlipidemia, under good control. 7.  Hypertension, well controlled. 8.  Family history of coronary artery disease.   9.  Mild sick sinus syndrome, not requiring pacemaker but only on Coreg 3.125 mg once daily.    PLAN:  1. Continue Xarelto as he is in atrial fibrillation. 2.  Because he is totally asymptomatic, we will not schedule for any cardioversion. DISCUSSION:  Mr. Rizwana Spaulding overall is doing well. He has had no chest pain or chest discomfort or any unusual shortness of breath or loss of energy to indicate that he has any symptoms from his atrial fibrillation. Because he is totally asymptomatic, I will not attempt to cardiovert him. He is most likely still going in and out of sinus rhythm. We would be unable to treat him with Cardizem because he is already mildly bradycardic. Therefore, if we attempted to treat, he would either need ablation or we would need to use an antiarrhythmic. Again, he is asymptomatic, and there is no indication to attempt to place him on antiarrhythmic to keep him in sinus rhythm. Thank you very much for allowing me the privilege of seeing Mr. Castillo. If you have any questions on my thoughts, please do not hesitate to contact me.      Sincerely,        Essence Douglas    D: 10/07/2021 8:27:53     T: 10/07/2021 8:30:59     [email or web address]  Job#: [id number]   Doc#: [id number]

## 2022-01-04 RX ORDER — LISINOPRIL 5 MG/1
TABLET ORAL
Qty: 90 TABLET | Refills: 3 | Status: SHIPPED | OUTPATIENT
Start: 2022-01-04

## 2022-03-14 RX ORDER — CARVEDILOL 3.12 MG/1
3.12 TABLET ORAL DAILY
Qty: 90 TABLET | Refills: 3 | Status: SHIPPED | OUTPATIENT
Start: 2022-03-14

## 2022-03-14 RX ORDER — CARVEDILOL 3.12 MG/1
3.12 TABLET ORAL DAILY
Qty: 90 TABLET | Refills: 3 | Status: SHIPPED | OUTPATIENT
Start: 2022-03-14 | End: 2022-03-14 | Stop reason: SDUPTHER

## 2022-07-15 RX ORDER — ATORVASTATIN CALCIUM 80 MG/1
TABLET, FILM COATED ORAL
Qty: 90 TABLET | Refills: 3 | Status: SHIPPED | OUTPATIENT
Start: 2022-07-15

## 2022-09-26 RX ORDER — RIVAROXABAN 20 MG/1
TABLET, FILM COATED ORAL
Qty: 90 TABLET | Refills: 6 | Status: SHIPPED | OUTPATIENT
Start: 2022-09-26

## 2022-09-28 ENCOUNTER — OFFICE VISIT (OUTPATIENT)
Dept: CARDIOLOGY CLINIC | Age: 62
End: 2022-09-28
Payer: COMMERCIAL

## 2022-09-28 ENCOUNTER — HOSPITAL ENCOUNTER (OUTPATIENT)
Age: 62
Discharge: HOME | End: 2022-09-28
Payer: COMMERCIAL

## 2022-09-28 ENCOUNTER — HOSPITAL ENCOUNTER (OUTPATIENT)
Age: 62
Discharge: HOME | End: 2022-09-30
Payer: COMMERCIAL

## 2022-09-28 ENCOUNTER — HOSPITAL ENCOUNTER (OUTPATIENT)
Dept: GENERAL RADIOLOGY | Age: 62
Discharge: HOME | End: 2022-09-30
Payer: COMMERCIAL

## 2022-09-28 VITALS — SYSTOLIC BLOOD PRESSURE: 110 MMHG | HEART RATE: 55 BPM | DIASTOLIC BLOOD PRESSURE: 60 MMHG

## 2022-09-28 DIAGNOSIS — R06.02 SOB (SHORTNESS OF BREATH): ICD-10-CM

## 2022-09-28 DIAGNOSIS — I10 ESSENTIAL HYPERTENSION: Primary | ICD-10-CM

## 2022-09-28 DIAGNOSIS — I48.0 PAROXYSMAL ATRIAL FIBRILLATION (HCC): ICD-10-CM

## 2022-09-28 DIAGNOSIS — I10 ESSENTIAL HYPERTENSION: ICD-10-CM

## 2022-09-28 DIAGNOSIS — R94.31 ABNORMAL EKG: ICD-10-CM

## 2022-09-28 DIAGNOSIS — E55.9 VITAMIN D DEFICIENCY DISEASE: ICD-10-CM

## 2022-09-28 DIAGNOSIS — E78.5 HYPERLIPIDEMIA, UNSPECIFIED HYPERLIPIDEMIA TYPE: ICD-10-CM

## 2022-09-28 DIAGNOSIS — Z95.820 S/P ANGIOPLASTY WITH STENT: ICD-10-CM

## 2022-09-28 LAB
ABSOLUTE EOS #: 0.1 K/UL (ref 0–0.4)
ABSOLUTE LYMPH #: 1 K/UL (ref 1–4.8)
ABSOLUTE MONO #: 0.4 K/UL (ref 0–1)
ALBUMIN SERPL-MCNC: 4.5 G/DL (ref 3.5–5.2)
ALP BLD-CCNC: 70 U/L (ref 40–129)
ALT SERPL-CCNC: 27 U/L (ref 5–41)
ANION GAP SERPL CALCULATED.3IONS-SCNC: 7 MMOL/L (ref 9–17)
AST SERPL-CCNC: 20 U/L
BASOPHILS # BLD: 0 % (ref 0–2)
BASOPHILS ABSOLUTE: 0 K/UL (ref 0–0.2)
BILIRUB SERPL-MCNC: 0.6 MG/DL (ref 0.3–1.2)
BUN BLDV-MCNC: 14 MG/DL (ref 8–23)
BUN/CREAT BLD: 17 (ref 9–20)
CALCIUM SERPL-MCNC: 8.9 MG/DL (ref 8.6–10.4)
CHLORIDE BLD-SCNC: 105 MMOL/L (ref 98–107)
CHOLESTEROL/HDL RATIO: 3
CHOLESTEROL: 118 MG/DL
CO2: 28 MMOL/L (ref 20–31)
CREAT SERPL-MCNC: 0.84 MG/DL (ref 0.7–1.2)
DIFFERENTIAL TYPE: YES
EKG ATRIAL RATE: 394 BPM
EKG Q-T INTERVAL: 428 MS
EKG QRS DURATION: 82 MS
EKG QTC CALCULATION (BAZETT): 441 MS
EKG R AXIS: 53 DEGREES
EKG T AXIS: 52 DEGREES
EKG VENTRICULAR RATE: 64 BPM
EOSINOPHILS RELATIVE PERCENT: 2 % (ref 0–5)
GFR AFRICAN AMERICAN: >60 ML/MIN
GFR NON-AFRICAN AMERICAN: >60 ML/MIN
GFR SERPL CREATININE-BSD FRML MDRD: ABNORMAL ML/MIN/{1.73_M2}
GLUCOSE BLD-MCNC: 118 MG/DL (ref 70–99)
HCT VFR BLD CALC: 42.4 % (ref 41–53)
HDLC SERPL-MCNC: 39 MG/DL
HEMOGLOBIN: 14.8 G/DL (ref 13.5–17.5)
LDL CHOLESTEROL: 65 MG/DL (ref 0–130)
LYMPHOCYTES # BLD: 16 % (ref 13–44)
MAGNESIUM: 1.9 MG/DL (ref 1.6–2.6)
MCH RBC QN AUTO: 32.4 PG (ref 26–34)
MCHC RBC AUTO-ENTMCNC: 34.8 G/DL (ref 31–37)
MCV RBC AUTO: 93 FL (ref 80–100)
MONOCYTES # BLD: 6 % (ref 5–9)
PATIENT FASTING?: YES
PDW BLD-RTO: 13.5 % (ref 12.1–15.2)
PLATELET # BLD: 178 K/UL (ref 140–450)
POTASSIUM SERPL-SCNC: 4.4 MMOL/L (ref 3.7–5.3)
RBC # BLD: 4.56 M/UL (ref 4.5–5.9)
SEG NEUTROPHILS: 76 % (ref 39–75)
SEGMENTED NEUTROPHILS ABSOLUTE COUNT: 4.8 K/UL (ref 2.1–6.5)
SODIUM BLD-SCNC: 140 MMOL/L (ref 135–144)
TOTAL PROTEIN: 2.3 G/DL (ref 6.4–8.3)
TRIGL SERPL-MCNC: 70 MG/DL
TSH SERPL DL<=0.05 MIU/L-ACNC: 1.46 UIU/ML (ref 0.3–5)
VITAMIN D 25-HYDROXY: 24.8 NG/ML
WBC # BLD: 6.4 K/UL (ref 3.5–11)

## 2022-09-28 PROCEDURE — 82306 VITAMIN D 25 HYDROXY: CPT

## 2022-09-28 PROCEDURE — 85025 COMPLETE CBC W/AUTO DIFF WBC: CPT

## 2022-09-28 PROCEDURE — 93005 ELECTROCARDIOGRAM TRACING: CPT

## 2022-09-28 PROCEDURE — 83735 ASSAY OF MAGNESIUM: CPT

## 2022-09-28 PROCEDURE — 71046 X-RAY EXAM CHEST 2 VIEWS: CPT

## 2022-09-28 PROCEDURE — 99214 OFFICE O/P EST MOD 30 MIN: CPT | Performed by: INTERNAL MEDICINE

## 2022-09-28 PROCEDURE — 93010 ELECTROCARDIOGRAM REPORT: CPT | Performed by: INTERNAL MEDICINE

## 2022-09-28 PROCEDURE — 80061 LIPID PANEL: CPT

## 2022-09-28 PROCEDURE — 80053 COMPREHEN METABOLIC PANEL: CPT

## 2022-09-28 PROCEDURE — 84443 ASSAY THYROID STIM HORMONE: CPT

## 2022-09-28 PROCEDURE — 36415 COLL VENOUS BLD VENIPUNCTURE: CPT

## 2022-09-28 NOTE — PROGRESS NOTES
Ov Dr. Erik Steward for one year follow up   Did not bring medications today   No hospitalizations/procedures/er visits  No chest pain   No heaviness   No sob   No dizziness   No issues   Son working at [employer] echo done           [employer] up for [employer] stress test and echo   Will see same day     Will call back with date he can do stress

## 2022-09-29 NOTE — PROGRESS NOTES
or pedal edema. No syncope or near syncope. He cannot tell that he is in atrial fibrillation. His ventricular response is quite regular in his atrial fibrillation. He has had no hospitalizations or procedures. CARDIAC RISK FACTORS:  Known CAD:  Positive. Hypertension:  Positive. Hyperlipidemia:  Positive. Other Family Members:  Positive. Peripheral Vascular Disease:  Negative. Diabetes:  Negative. Smoking:  Negative. MEDICATIONS AT HOME:  He is currently on aspirin 81 mg daily, Lipitor 80 mg daily, Coreg 3.125 mg daily, lisinopril 5 mg daily, Xarelto 20 mg daily. PAST MEDICAL AND SURGICAL HISTORY:  1.  Palpitations. 2.  Hypertension since . 3.  Hyperlipidemia. 4.  Hernia repair 14 years ago on the right side. 5.  Left hernia repair on 2017. 6.  Cardiac as described above, status post angioplasty of the LAD in . FAMILY HISTORY:  Mother had stents. Multiple aunts and uncles had stents. Father  at 67. Seven brothers and sister. SOCIAL HISTORY:  He is 58years old. Son, Karyl Osler, is 28years old, finishing college, working now for [employer] at home. He does not smoke or drink alcohol. Does not exercise. Stays active. He is a friend of Ade Castro. 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 REVIEW OF SYSTEMS:  Cardiac as above. Other systems reviewed including constitutional, eyes, ears, nose and throat, cardiovascular, respiratory, GI, , musculoskeletal, integumentary, neurologic, endocrine, hematologic and allergic/immunologic are negative except for what is described above. No weight loss or weight gain. No change in bowel habits. No blood in stools. No fevers, sweats or chills. PHYSICAL EXAMINATION:  VITAL SIGNS:  His blood pressure was 110/60 with a heart rate of 55 and fairly regular although not perfectly regular. O2 sat was 94%. GENERAL:  He is a pleasant 44-year-old gentleman. Denied pain. He was oriented to person, place and time. Answered questions appropriately. SKIN:  No unusual skin changes. HEENT:  The pupils are equally round and reactive to light and accommodation. Extraocular movements were intact. Mucous membranes were dry. NECK:  No JVD. Good carotid pulses. No carotid bruits. No lymphadenopathy or thyromegaly. CARDIOVASCULAR EXAM:  S1 and S2 were normal.  No S3 or S4. Soft systolic blowing type murmur. No diastolic murmur. PMI was normal.  No lift, thrust, or pericardial friction rub. LUNGS:  Clear to auscultation and percussion. ABDOMEN:  Soft and nontender. Good bowel sounds. EXTREMITIES:  Good femoral pulses. Good pedal pulses. No pedal edema. Skin was warm and dry. No calf tenderness. Nail beds pink. Good cap refill. PULSES:  Bilateral symmetrical radial, brachial and carotid pulses. No carotid bruits. Good femoral and pedal pulses. NEUROLOGIC EXAM:  Within normal limits. PSYCHIATRIC EXAM:  Within normal limits. LABORATORY DATA:  His sodium was 140, potassium 4.4, BUN 14, creatinine 0.84, GFR greater than 60, magnesium 1.9, glucose 118, calcium was 8.9. His ALT was 27, AST was 20. TSH 1.46. White count 6.4, hemoglobin 14.8 with a platelet count 637,301. EKG showed atrial fibrillation with a controlled ventricular response, with nonspecific ST changes. IMPRESSION:  1. Mild shortness of breath with chest pain although fairly atypical.  2.  History of paroxysmal atrial fibrillation, discovered in 2020, with him spontaneously converting to sinus rhythm, and then back into atrial fibrillation, which has been totally asymptomatic. 3.  Mildly dilated right-sided chambers with borderline LV function. 4.  Coronary artery disease, status post catheterization on 12/09/2015, showed 70% LAD, 40% circumflex and right coronary artery, EF of 60%. 5.  Angioplasty of the LAD on 12/16/2015, placing 2.75 x 33 mm drug-eluting Xience stent. 6.  Hyperlipidemia, under good control.   7.  Hypertension, well controlled. 8.  Family history of coronary artery disease. 9.  Mild sick sinus syndrome, not requiring a pacemaker but only on Coreg 3.125 mg once a day. PLAN:  1. We will do a treadmill Myoview stress test due to his abnormal EKG and his mild atypical chest pain and history of coronary artery disease. 2.  We will do an echocardiogram because of his shortness of breath. 3.  We will meet with him on the same day and go over the results. 4.  If there is any abnormality, we could repeat cardiac catheterization as it has been 7 years since his last catheterization. 5.  We will make a decision whether to attempt to cardiovert back to sinus rhythm or try to maintain sinus rhythm. DISCUSSION:  Mr. Eric Cheney, [place], is doing well. He is in atrial fibrillation again today and I suspect that he has been in chronic atrial fibrillation, although it is impossible to tell for sure. His rate is very well controlled and it is almost regular and it would be very difficult to tell that he is in atrial fibrillation by feeling his pulse. I have not attempted to keep him in atrial fibrillation as he is totally asymptomatic. However, I think it is prudent to make sure that there has been no progression of his coronary artery disease or change in his LV function. His EF to me by the bedside echocardiogram _____% range. We will do a treadmill Myoview and an echocardiogram and I will meet with him on the same day. If there is any abnormality, again, I would proceed with a cardiac catheterization. Hopefully, this would not be necessary. Thank you very much for allowing me the privilege of seeing Mr. Castillo. If you have any questions on my thoughts, please do not hesitate to contact me. Sincerely,        Jennifer Henry    D: 09/28/2022 9:22:04     T: 09/28/2022 10:52:05     [email or web address]  Job#: [id number]   Doc#: [id number]    ADDENDUM:    Alecia Akers M.D.   [address]  [address] [place]  [phone]        2022        RE:   Eric Smith  :  1960    CHIEF COMPLAINT:  Abnormal Lexiscan Cardiolite stress test with perfusion abnormality in the lateral region due to artifact or ischemia, which I felt was most likely due to ischemia with echocardiogram showing EF of 50% to 55% with moderate to severely dilated left atrium and right atrium, mildly dilated right ventricle. HISTORY OF PRESENT ILLNESS:  I saw Eric Smith in our office following his [facility] stress test and echocardiogram.  I saw him for full H and P on 2022. At that time, he was having shortness of breath with chest pain. He has a history of coronary artery disease with a catheterization on 2015, with angioplasty of the LAD on 2015, placing a 2.75 x 33 mm drug-eluting Xience stent. Because of his shortness of breath and chest pain, I brought him back for a stress test and an echocardiogram.  He has arthritis and cannot walk a treadmill. His echocardiogram showed normal LV function, EF of 55% with moderate to severely dilated left atrium and right atrium, mildly dilated right ventricle. His stress test showed a defect laterally consistent with artifact or ischemia. I felt it was more likely secondary to ischemia. I went over the perfusion scan with Maritza Ruiz and the defect was easily seen. In fact, it was more severe than the stress test in  before his angioplasty of the LAD. At that time, he also had a 40% lesion of the circumflex and the right coronary artery. In view of his symptoms of chest pain and shortness of breath along with his abnormal Lexiscan with perfusion laterally, I think it is reasonable to proceed with a cardiac catheterization to define his anatomy. He has been tried on full medical therapy and continues to have his chest pain and shortness of breath.     We will proceed with a catheterization on 11/18/2022, in [place]. Thank you very much for allowing me the privilege of seeing Mr. Castillo. If you have any questions on my thoughts, please do not hesitate to contact me.     Sincerely,        Dilia Beebe    D: 10/17/2022 5:09:35     T: 10/17/2022 5:12:58     [email or web address]  Job#: [id number]   Doc#: [id number]

## 2022-10-11 ENCOUNTER — HOSPITAL ENCOUNTER (OUTPATIENT)
Dept: NON INVASIVE DIAGNOSTICS | Age: 62
Discharge: HOME | End: 2022-10-11
Payer: COMMERCIAL

## 2022-10-11 ENCOUNTER — HOSPITAL ENCOUNTER (OUTPATIENT)
Dept: NUCLEAR MEDICINE | Age: 62
Discharge: HOME | End: 2022-10-13
Payer: COMMERCIAL

## 2022-10-11 ENCOUNTER — OFFICE VISIT (OUTPATIENT)
Dept: CARDIOLOGY CLINIC | Age: 62
End: 2022-10-11
Payer: COMMERCIAL

## 2022-10-11 VITALS — HEART RATE: 62 BPM | SYSTOLIC BLOOD PRESSURE: 126 MMHG | RESPIRATION RATE: 20 BRPM | DIASTOLIC BLOOD PRESSURE: 86 MMHG

## 2022-10-11 DIAGNOSIS — R06.02 SOB (SHORTNESS OF BREATH): ICD-10-CM

## 2022-10-11 DIAGNOSIS — I10 ESSENTIAL HYPERTENSION: Primary | ICD-10-CM

## 2022-10-11 DIAGNOSIS — R94.31 ABNORMAL EKG: ICD-10-CM

## 2022-10-11 DIAGNOSIS — I10 ESSENTIAL HYPERTENSION: ICD-10-CM

## 2022-10-11 DIAGNOSIS — R94.39 ABNORMAL STRESS TEST: ICD-10-CM

## 2022-10-11 DIAGNOSIS — I48.0 PAROXYSMAL ATRIAL FIBRILLATION (HCC): ICD-10-CM

## 2022-10-11 DIAGNOSIS — E78.5 HYPERLIPIDEMIA, UNSPECIFIED HYPERLIPIDEMIA TYPE: ICD-10-CM

## 2022-10-11 LAB
LV EF: 53 %
LVEF MODALITY: NORMAL

## 2022-10-11 PROCEDURE — 93017 CV STRESS TEST TRACING ONLY: CPT

## 2022-10-11 PROCEDURE — 6360000002 HC RX W HCPCS: Performed by: INTERNAL MEDICINE

## 2022-10-11 PROCEDURE — 99212 OFFICE O/P EST SF 10 MIN: CPT | Performed by: INTERNAL MEDICINE

## 2022-10-11 PROCEDURE — 3430000000 HC RX DIAGNOSTIC RADIOPHARMACEUTICAL: Performed by: INTERNAL MEDICINE

## 2022-10-11 PROCEDURE — 93306 TTE W/DOPPLER COMPLETE: CPT

## 2022-10-11 PROCEDURE — 78452 HT MUSCLE IMAGE SPECT MULT: CPT

## 2022-10-11 PROCEDURE — 2580000003 HC RX 258: Performed by: INTERNAL MEDICINE

## 2022-10-11 PROCEDURE — A9500 TC99M SESTAMIBI: HCPCS | Performed by: INTERNAL MEDICINE

## 2022-10-11 RX ORDER — AMINOPHYLLINE DIHYDRATE 25 MG/ML
50 INJECTION, SOLUTION INTRAVENOUS PRN
Status: DISCONTINUED | OUTPATIENT
Start: 2022-10-11 | End: 2022-10-11 | Stop reason: HOSPADM

## 2022-10-11 RX ORDER — SODIUM CHLORIDE 0.9 % (FLUSH) 0.9 %
5-40 SYRINGE (ML) INJECTION PRN
Status: DISCONTINUED | OUTPATIENT
Start: 2022-10-11 | End: 2022-10-11 | Stop reason: HOSPADM

## 2022-10-11 RX ADMIN — SODIUM CHLORIDE, PRESERVATIVE FREE 10 ML: 5 INJECTION INTRAVENOUS at 09:21

## 2022-10-11 RX ADMIN — TETRAKIS(2-METHOXYISOBUTYLISOCYANIDE)COPPER(I) TETRAFLUOROBORATE 30 MILLICURIE: 1 INJECTION, POWDER, LYOPHILIZED, FOR SOLUTION INTRAVENOUS at 09:21

## 2022-10-11 RX ADMIN — TETRAKIS(2-METHOXYISOBUTYLISOCYANIDE)COPPER(I) TETRAFLUOROBORATE 10 MILLICURIE: 1 INJECTION, POWDER, LYOPHILIZED, FOR SOLUTION INTRAVENOUS at 07:58

## 2022-10-11 RX ADMIN — REGADENOSON 0.4 MG: 0.08 INJECTION, SOLUTION INTRAVENOUS at 09:21

## 2022-10-11 NOTE — PROGRESS NOTES
Lexiscan administered, pt experiences shortness of breath and little light headed. 09:28 Pt reports \"feeling back to normal\"  09:30 This part of test completed, pt given snack and beverage.

## 2022-10-11 NOTE — PROGRESS NOTES
Ov Dr. Liya Varghese for follow up   Same day stress test appt   No chest pain   No heaviness   No sob    Will set up for heart cath on Nov 18th

## 2022-10-12 NOTE — PROCEDURES
[place] [address]                              CARDIAC STRESS TEST    PATIENT NAME: Eliz Mosley                    :        1960  MED REC NO:   [id number]                              ROOM:  ACCOUNT NO:   [de-identified]                           ADMIT DATE: 10/11/2022  PROVIDER:     Masrhal Pickering. Mando Espino MD      DATE OF STUDY:  10/11/2022    LEXISCAN CARDIOLITE STRESS TEST:    INDICATION:  Chest pain. IMPRESSION:  1. We gave 0.4 mg of Lexiscan intravenously. 2.  This was followed in 20 seconds by Cardiolite infusion. 3.  There was no chest pain. 4.  There was no ST depression. 5.  It was an overall negative Lexiscan stress test.  6.  Cardiolite to follow.         Audra Altman MD    D: 10/12/2022 4:11:10       T: 10/12/2022 5:14:14     [email or web address]  Job#: [id number]     Doc#: [id number]    CC:  Ami Hendricks MD

## 2022-10-12 NOTE — PROCEDURES
[place] [address]                              CARDIAC STRESS TEST    PATIENT NAME: Luis Armando Rogers                    :        1960  MED REC NO:   [id number]                              ROOM:  ACCOUNT NO:   [de-identified]                           ADMIT DATE: 10/11/2022  PROVIDER:     Radha Bernardo      DATE OF STUDY:  10/11/2022    Cardiovascular Diagnostics Department    Ordering Provider:  Valdez Benjamin MD    Primary Care Provider:  Alpesh Alvarez MD    Interpreting Physician:  Radha Bernardo MD    MYOCARDIAL PERFUSION STRESS IMAGING    The stress ECG results are reported separately. NUCLEAR IMAGING RESULTS:  The overall quality of the study is good. Mild attenuation artifact was seen. There is no evidence of abnormal  lung uptake. Additionally, the right ventricle appears normal.  The  left ventricular cavity is noted to be normal in size on stress images. There is no evidence of transient ischemic dilatation (TID) of the left  ventricle. Gated SPECT imaging reveals normal myocardial thickening and wall motion  with a calculated left ventricular ejection fraction (EF) of 58%. The rest images demonstrated a small perfusion abnormality of mild  intensity in the lateral region(s) which is most likely due to artifact. On stress imaging, a small perfusion abnormality of mild intensity was  noted in the anterior region(s) which is most likely due to artifact. IMPRESSION:  1. Most likely normal myocardial perfusion imaging with soft tissue  artifact but without evidence of significant myocardial ischemia or  infarction. 2.  Global left ventricular systolic function was normal without  regional wall motion abnormalities. Overall these results are most consistent with a low risk for  significant coronary artery disease.          Mee Pandey    D: 10/12/2022 94:99:90       T: 10/12/2022 12:09:17     [email or web address]  Job#: [id number]     Doc#: Unknown    CC:  MD Luis Cabrera.  Wandy Reyes MD

## 2022-10-19 NOTE — PROGRESS NOTES
Joe Owens M.D. [address]   [phone]        2022        RE:   Sheron Belcher  :  1960    CHIEF COMPLAINT:  Abnormal Lexiscan Cardiolite stress test with perfusion abnormality in the lateral region due to artifact or ischemia, which I felt was most likely due to ischemia with echocardiogram showing EF of 50% to 55% with moderate to severely dilated left atrium and right atrium, mildly dilated right ventricle. HISTORY OF PRESENT ILLNESS:  I saw Sheron Belcher in our office following his [address] [place] [address] stress test and echocardiogram.  I saw him for full H and P on 2022. At that time, he was having shortness of breath with chest pain. He has a history of coronary artery disease with a catheterization on 2015, with angioplasty of the LAD on 2015, placing a 2.75 x 33 mm drug-eluting Xience stent. Because of his shortness of breath and chest pain, I brought him back for a stress test and an echocardiogram.  He has arthritis and cannot walk a treadmill. His echocardiogram showed normal LV function, EF of 55% with moderate to severely dilated left atrium and right atrium, mildly dilated right ventricle. His stress test showed a defect laterally consistent with artifact or ischemia. I felt it was more likely secondary to ischemia. I went over the perfusion scan with Cesia Sanders and the defect was easily seen. In fact, it was more severe than the stress test in  before his angioplasty of the LAD. At that time, he also had a 40% lesion of the circumflex and the right coronary artery. In view of his symptoms of chest pain and shortness of breath along with his abnormal Lexiscan with perfusion laterally, I think it is reasonable to proceed with a cardiac catheterization to define his anatomy.   He has been tried on full medical therapy and continues to have his chest pain and shortness of breath. We will proceed with a catheterization on 11/18/2022, in [place]. Thank you very much for allowing me the privilege of seeing Mr. Castillo. If you have any questions on my thoughts, please do not hesitate to contact me.     Sincerely,        Jose Brandon    D: 10/17/2022 5:09:35     T: 10/17/2022 5:12:58     [email or web address]  Job#: [id number]   Doc#: [id number]

## 2022-11-14 ENCOUNTER — TELEPHONE (OUTPATIENT)
Dept: CARDIOLOGY CLINIC | Age: 62
End: 2022-11-14

## 2022-11-14 NOTE — TELEPHONE ENCOUNTER
Hilda Crowley called to state that he needed an idea of how long he would be off work for his heart cath. He stated that he has already told his employer that he would be off Friday and Monday and he would like to be off Monday, Tuesday and Wednesday and Thursday for Thanksgiving. He would go back to work on Friday. He states that he needs to know now to give them the dates off. Please call Hilda Crowley.

## 2022-11-15 DIAGNOSIS — I10 ESSENTIAL HYPERTENSION: ICD-10-CM

## 2022-11-15 DIAGNOSIS — E78.5 HYPERLIPIDEMIA, UNSPECIFIED HYPERLIPIDEMIA TYPE: ICD-10-CM

## 2022-11-15 DIAGNOSIS — R94.39 ABNORMAL STRESS TEST: ICD-10-CM

## 2022-11-15 DIAGNOSIS — I48.0 PAROXYSMAL ATRIAL FIBRILLATION (HCC): ICD-10-CM

## 2022-11-22 DIAGNOSIS — I10 ESSENTIAL HYPERTENSION: Primary | ICD-10-CM

## 2022-11-22 DIAGNOSIS — E78.5 HYPERLIPIDEMIA, UNSPECIFIED HYPERLIPIDEMIA TYPE: ICD-10-CM

## 2022-11-22 DIAGNOSIS — Z95.820 S/P ANGIOPLASTY WITH STENT: ICD-10-CM

## 2022-11-22 DIAGNOSIS — I48.0 PAROXYSMAL ATRIAL FIBRILLATION (HCC): ICD-10-CM

## 2023-01-03 RX ORDER — LISINOPRIL 5 MG/1
TABLET ORAL
Qty: 90 TABLET | Refills: 3 | Status: SHIPPED | OUTPATIENT
Start: 2023-01-03

## 2023-04-06 RX ORDER — CARVEDILOL 3.12 MG/1
TABLET ORAL
Qty: 90 TABLET | Refills: 3 | Status: SHIPPED | OUTPATIENT
Start: 2023-04-06

## 2023-07-10 RX ORDER — ATORVASTATIN CALCIUM 80 MG/1
TABLET, FILM COATED ORAL
Qty: 90 TABLET | Refills: 3 | Status: SHIPPED | OUTPATIENT
Start: 2023-07-10

## 2023-08-04 ENCOUNTER — TELEPHONE (OUTPATIENT)
Dept: CARDIOLOGY CLINIC | Age: 63
End: 2023-08-04

## 2023-08-04 NOTE — TELEPHONE ENCOUNTER
Received form from insurance co to change xarelto to pradaxa   Pt called received letter   He does not want to change he called insurance and he told them he is staying on xarelto  Informed that was fine.

## 2023-09-18 DIAGNOSIS — E78.2 MIXED HYPERLIPIDEMIA: ICD-10-CM

## 2023-09-18 DIAGNOSIS — Z13.29 SCREENING FOR THYROID DISORDER: ICD-10-CM

## 2023-09-18 DIAGNOSIS — Z95.820 S/P ANGIOPLASTY WITH STENT: ICD-10-CM

## 2023-09-18 DIAGNOSIS — R06.02 SOB (SHORTNESS OF BREATH): ICD-10-CM

## 2023-09-18 DIAGNOSIS — E55.9 VITAMIN D DEFICIENCY DISEASE: Primary | ICD-10-CM

## 2023-09-18 DIAGNOSIS — I48.0 PAROXYSMAL ATRIAL FIBRILLATION (HCC): ICD-10-CM

## 2023-09-18 DIAGNOSIS — I10 HYPERTENSION, UNSPECIFIED TYPE: ICD-10-CM

## 2023-12-04 NOTE — TELEPHONE ENCOUNTER
Patient called for refill of his Xarelto 20mg daily. He only has 3 tabs left and no refills.   [facility]

## 2023-12-05 ENCOUNTER — HOSPITAL ENCOUNTER (OUTPATIENT)
Age: 63
Discharge: HOME | End: 2023-12-07
Payer: COMMERCIAL

## 2023-12-05 ENCOUNTER — HOSPITAL ENCOUNTER (OUTPATIENT)
Age: 63
Discharge: HOME | End: 2023-12-05
Payer: COMMERCIAL

## 2023-12-05 ENCOUNTER — OFFICE VISIT (OUTPATIENT)
Dept: CARDIOLOGY CLINIC | Age: 63
End: 2023-12-05

## 2023-12-05 ENCOUNTER — HOSPITAL ENCOUNTER (OUTPATIENT)
Dept: GENERAL RADIOLOGY | Age: 63
Discharge: HOME | End: 2023-12-07
Payer: COMMERCIAL

## 2023-12-05 VITALS
BODY MASS INDEX: 28.27 KG/M2 | WEIGHT: 197 LBS | HEART RATE: 82 BPM | DIASTOLIC BLOOD PRESSURE: 80 MMHG | SYSTOLIC BLOOD PRESSURE: 140 MMHG | OXYGEN SATURATION: 97 %

## 2023-12-05 DIAGNOSIS — E78.2 MIXED HYPERLIPIDEMIA: ICD-10-CM

## 2023-12-05 DIAGNOSIS — E55.9 VITAMIN D DEFICIENCY DISEASE: ICD-10-CM

## 2023-12-05 DIAGNOSIS — I10 ESSENTIAL HYPERTENSION: Primary | ICD-10-CM

## 2023-12-05 DIAGNOSIS — Z13.29 SCREENING FOR THYROID DISORDER: ICD-10-CM

## 2023-12-05 DIAGNOSIS — I10 HYPERTENSION, UNSPECIFIED TYPE: ICD-10-CM

## 2023-12-05 DIAGNOSIS — I48.0 PAROXYSMAL ATRIAL FIBRILLATION (HCC): ICD-10-CM

## 2023-12-05 DIAGNOSIS — R06.02 SOB (SHORTNESS OF BREATH): ICD-10-CM

## 2023-12-05 DIAGNOSIS — Z95.820 S/P ANGIOPLASTY WITH STENT: ICD-10-CM

## 2023-12-05 DIAGNOSIS — E78.5 HYPERLIPIDEMIA, UNSPECIFIED HYPERLIPIDEMIA TYPE: ICD-10-CM

## 2023-12-05 LAB
ALBUMIN SERPL-MCNC: 4.5 G/DL (ref 3.5–5.2)
ALP SERPL-CCNC: 76 U/L (ref 40–129)
ALT SERPL-CCNC: 22 U/L (ref 5–41)
ANION GAP SERPL CALCULATED.3IONS-SCNC: 7 MMOL/L (ref 9–17)
AST SERPL-CCNC: 19 U/L
BASOPHILS # BLD: 0.02 K/UL (ref 0–0.2)
BASOPHILS NFR BLD: 0 % (ref 0–2)
BILIRUB SERPL-MCNC: 0.5 MG/DL (ref 0.3–1.2)
BUN SERPL-MCNC: 12 MG/DL (ref 8–23)
BUN/CREAT SERPL: 15 (ref 9–20)
CALCIUM SERPL-MCNC: 8.9 MG/DL (ref 8.6–10.4)
CHLORIDE SERPL-SCNC: 103 MMOL/L (ref 98–107)
CO2 SERPL-SCNC: 27 MMOL/L (ref 20–31)
CREAT SERPL-MCNC: 0.8 MG/DL (ref 0.7–1.2)
EKG Q-T INTERVAL: 396 MS
EKG QRS DURATION: 86 MS
EKG QTC CALCULATION (BAZETT): 396 MS
EKG R AXIS: 72 DEGREES
EKG T AXIS: 61 DEGREES
EKG VENTRICULAR RATE: 60 BPM
EOSINOPHIL # BLD: 0.14 K/UL (ref 0–0.4)
EOSINOPHILS RELATIVE PERCENT: 2 % (ref 0–5)
ERYTHROCYTE [DISTWIDTH] IN BLOOD BY AUTOMATED COUNT: 11.9 % (ref 12.1–15.2)
GFR SERPL CREATININE-BSD FRML MDRD: >60 ML/MIN/1.73M2
GLUCOSE SERPL-MCNC: 103 MG/DL (ref 70–99)
HCT VFR BLD AUTO: 42.6 % (ref 41–53)
HGB BLD-MCNC: 15.2 G/DL (ref 13.5–17.5)
IMM GRANULOCYTES # BLD AUTO: 0.01 K/UL (ref 0–0.3)
IMM GRANULOCYTES NFR BLD: 0 % (ref 0–5)
LYMPHOCYTES NFR BLD: 1.18 K/UL (ref 1–4.8)
LYMPHOCYTES RELATIVE PERCENT: 18 % (ref 13–44)
MAGNESIUM SERPL-MCNC: 2.2 MG/DL (ref 1.6–2.6)
MCH RBC QN AUTO: 31.6 PG (ref 26–34)
MCHC RBC AUTO-ENTMCNC: 35.7 G/DL (ref 31–37)
MCV RBC AUTO: 88.6 FL (ref 80–100)
MONOCYTES NFR BLD: 0.45 K/UL (ref 0–1)
MONOCYTES NFR BLD: 7 % (ref 5–9)
NEUTROPHILS NFR BLD: 73 % (ref 39–75)
NEUTS SEG NFR BLD: 4.76 K/UL (ref 2.1–6.5)
PLATELET # BLD AUTO: 184 K/UL (ref 140–450)
PMV BLD AUTO: 9.2 FL (ref 6–12)
POTASSIUM SERPL-SCNC: 4.3 MMOL/L (ref 3.7–5.3)
PROT SERPL-MCNC: 6.6 G/DL (ref 6.4–8.3)
RBC # BLD AUTO: 4.81 M/UL (ref 4.5–5.9)
SODIUM SERPL-SCNC: 137 MMOL/L (ref 135–144)
TSH SERPL DL<=0.05 MIU/L-ACNC: 1.14 UIU/ML (ref 0.3–5)
WBC OTHER # BLD: 6.6 K/UL (ref 3.5–11)

## 2023-12-05 PROCEDURE — 80061 LIPID PANEL: CPT

## 2023-12-05 PROCEDURE — 93005 ELECTROCARDIOGRAM TRACING: CPT

## 2023-12-05 PROCEDURE — 84443 ASSAY THYROID STIM HORMONE: CPT

## 2023-12-05 PROCEDURE — 85025 COMPLETE CBC W/AUTO DIFF WBC: CPT

## 2023-12-05 PROCEDURE — 80053 COMPREHEN METABOLIC PANEL: CPT

## 2023-12-05 PROCEDURE — 83735 ASSAY OF MAGNESIUM: CPT

## 2023-12-05 PROCEDURE — 93010 ELECTROCARDIOGRAM REPORT: CPT | Performed by: INTERNAL MEDICINE

## 2023-12-05 PROCEDURE — 36415 COLL VENOUS BLD VENIPUNCTURE: CPT

## 2023-12-05 PROCEDURE — 82306 VITAMIN D 25 HYDROXY: CPT

## 2023-12-05 PROCEDURE — 71046 X-RAY EXAM CHEST 2 VIEWS: CPT

## 2023-12-05 NOTE — PROGRESS NOTES
Ov Dr. Pisano Go for one year follow up   No hospitalizations/procedures/er visits  No chest pain   No palpitations   No sob   No dizziness  No new issues   Levolucindae Held went back into rehab   Dtg lives in [place] with 3 kids  14/12/11     No changes    Follow up in one year

## 2023-12-06 LAB
25(OH)D3 SERPL-MCNC: 17.6 NG/ML (ref 30–100)
CHOLEST SERPL-MCNC: 120 MG/DL (ref 0–199)
CHOLESTEROL/HDL RATIO: 3
HDLC SERPL-MCNC: 45 MG/DL
LDLC SERPL CALC-MCNC: 65 MG/DL (ref 0–100)
TRIGL SERPL-MCNC: 53 MG/DL (ref 0–149)
VLDLC SERPL CALC-MCNC: 11 MG/DL

## 2023-12-26 RX ORDER — LISINOPRIL 5 MG/1
TABLET ORAL
Qty: 90 TABLET | Refills: 3 | Status: SHIPPED | OUTPATIENT
Start: 2023-12-26

## 2024-04-01 RX ORDER — CARVEDILOL 3.12 MG/1
TABLET ORAL
Qty: 90 TABLET | Refills: 3 | Status: SHIPPED | OUTPATIENT
Start: 2024-04-01

## 2024-07-08 RX ORDER — ATORVASTATIN CALCIUM 80 MG/1
TABLET, FILM COATED ORAL
Qty: 90 TABLET | Refills: 3 | Status: SHIPPED | OUTPATIENT
Start: 2024-07-08 | End: 2024-07-09 | Stop reason: SDUPTHER

## 2024-07-10 RX ORDER — CARVEDILOL 3.12 MG/1
3.12 TABLET ORAL DAILY
Qty: 90 TABLET | Refills: 3 | Status: SHIPPED | OUTPATIENT
Start: 2024-07-10

## 2024-07-10 RX ORDER — ATORVASTATIN CALCIUM 80 MG/1
80 TABLET, FILM COATED ORAL DAILY
Qty: 90 TABLET | Refills: 3 | Status: SHIPPED | OUTPATIENT
Start: 2024-07-10

## 2024-11-05 ENCOUNTER — HOSPITAL ENCOUNTER (OUTPATIENT)
Age: 64
Discharge: HOME | End: 2024-11-07
Payer: COMMERCIAL

## 2024-11-05 ENCOUNTER — HOSPITAL ENCOUNTER (OUTPATIENT)
Age: 64
Discharge: HOME | End: 2024-11-05
Payer: COMMERCIAL

## 2024-11-05 ENCOUNTER — HOSPITAL ENCOUNTER (OUTPATIENT)
Dept: GENERAL RADIOLOGY | Age: 64
Discharge: HOME | End: 2024-11-07
Payer: COMMERCIAL

## 2024-11-05 ENCOUNTER — OFFICE VISIT (OUTPATIENT)
Dept: CARDIOLOGY CLINIC | Age: 64
End: 2024-11-05

## 2024-11-05 VITALS
WEIGHT: 197 LBS | HEART RATE: 78 BPM | BODY MASS INDEX: 28.27 KG/M2 | SYSTOLIC BLOOD PRESSURE: 148 MMHG | DIASTOLIC BLOOD PRESSURE: 82 MMHG | OXYGEN SATURATION: 97 %

## 2024-11-05 DIAGNOSIS — I48.0 PAROXYSMAL ATRIAL FIBRILLATION (HCC): ICD-10-CM

## 2024-11-05 DIAGNOSIS — E55.9 VITAMIN D DEFICIENCY DISEASE: ICD-10-CM

## 2024-11-05 DIAGNOSIS — I10 ESSENTIAL HYPERTENSION: ICD-10-CM

## 2024-11-05 DIAGNOSIS — E78.5 HYPERLIPIDEMIA, UNSPECIFIED HYPERLIPIDEMIA TYPE: ICD-10-CM

## 2024-11-05 DIAGNOSIS — I10 ESSENTIAL HYPERTENSION: Primary | ICD-10-CM

## 2024-11-05 LAB
25(OH)D3 SERPL-MCNC: 29.8 NG/ML (ref 30–100)
ALBUMIN SERPL-MCNC: 4.2 G/DL (ref 3.5–5.2)
ALP SERPL-CCNC: 69 U/L (ref 40–129)
ALT SERPL-CCNC: 25 U/L (ref 5–41)
ANION GAP SERPL CALCULATED.3IONS-SCNC: 9 MMOL/L (ref 9–17)
AST SERPL-CCNC: 21 U/L
BASOPHILS # BLD: 0.01 K/UL (ref 0–0.2)
BASOPHILS NFR BLD: 0 % (ref 0–2)
BILIRUB SERPL-MCNC: 0.7 MG/DL (ref 0.3–1.2)
BUN SERPL-MCNC: 11 MG/DL (ref 8–23)
BUN/CREAT SERPL: 14 (ref 9–20)
CALCIUM SERPL-MCNC: 9.2 MG/DL (ref 8.6–10.4)
CHLORIDE SERPL-SCNC: 109 MMOL/L (ref 98–107)
CHOLEST SERPL-MCNC: 120 MG/DL (ref 0–199)
CHOLESTEROL/HDL RATIO: 3.1
CO2 SERPL-SCNC: 25 MMOL/L (ref 20–31)
CREAT SERPL-MCNC: 0.8 MG/DL (ref 0.7–1.2)
EKG Q-T INTERVAL: 406 MS
EKG QRS DURATION: 80 MS
EKG QTC CALCULATION (BAZETT): 398 MS
EKG R AXIS: 85 DEGREES
EKG T AXIS: 76 DEGREES
EKG VENTRICULAR RATE: 58 BPM
EOSINOPHIL # BLD: 0.12 K/UL (ref 0–0.4)
EOSINOPHILS RELATIVE PERCENT: 2 % (ref 0–5)
ERYTHROCYTE [DISTWIDTH] IN BLOOD BY AUTOMATED COUNT: 12.5 % (ref 12.1–15.2)
GFR, ESTIMATED: >90 ML/MIN/1.73M2
GLUCOSE SERPL-MCNC: 103 MG/DL (ref 70–99)
HCT VFR BLD AUTO: 40.5 % (ref 41–53)
HDLC SERPL-MCNC: 39 MG/DL
HGB BLD-MCNC: 14.4 G/DL (ref 13.5–17.5)
IMM GRANULOCYTES # BLD AUTO: 0 K/UL (ref 0–0.3)
IMM GRANULOCYTES NFR BLD: 0 % (ref 0–5)
LDLC SERPL CALC-MCNC: 71 MG/DL (ref 0–100)
LYMPHOCYTES NFR BLD: 0.93 K/UL (ref 1–4.8)
LYMPHOCYTES RELATIVE PERCENT: 17 % (ref 13–44)
MAGNESIUM SERPL-MCNC: 2 MG/DL (ref 1.6–2.6)
MCH RBC QN AUTO: 32.6 PG (ref 26–34)
MCHC RBC AUTO-ENTMCNC: 35.6 G/DL (ref 31–37)
MCV RBC AUTO: 91.6 FL (ref 80–100)
MONOCYTES NFR BLD: 0.38 K/UL (ref 0–1)
MONOCYTES NFR BLD: 7 % (ref 5–9)
NEUTROPHILS NFR BLD: 74 % (ref 39–75)
NEUTS SEG NFR BLD: 4.05 K/UL (ref 2.1–6.5)
PLATELET # BLD AUTO: 158 K/UL (ref 140–450)
PMV BLD AUTO: 9.6 FL (ref 6–12)
POTASSIUM SERPL-SCNC: 4.3 MMOL/L (ref 3.7–5.3)
PROT SERPL-MCNC: 6.4 G/DL (ref 6.4–8.3)
RBC # BLD AUTO: 4.42 M/UL (ref 4.5–5.9)
SODIUM SERPL-SCNC: 143 MMOL/L (ref 135–144)
TRIGL SERPL-MCNC: 50 MG/DL
TSH SERPL DL<=0.05 MIU/L-ACNC: 1.25 UIU/ML (ref 0.3–5)
VLDLC SERPL CALC-MCNC: 10 MG/DL (ref 1–30)
WBC OTHER # BLD: 5.5 K/UL (ref 3.5–11)

## 2024-11-05 PROCEDURE — 93005 ELECTROCARDIOGRAM TRACING: CPT

## 2024-11-05 PROCEDURE — 84443 ASSAY THYROID STIM HORMONE: CPT

## 2024-11-05 PROCEDURE — 71046 X-RAY EXAM CHEST 2 VIEWS: CPT

## 2024-11-05 PROCEDURE — 36415 COLL VENOUS BLD VENIPUNCTURE: CPT

## 2024-11-05 PROCEDURE — 82306 VITAMIN D 25 HYDROXY: CPT

## 2024-11-05 PROCEDURE — 83735 ASSAY OF MAGNESIUM: CPT

## 2024-11-05 PROCEDURE — 85025 COMPLETE CBC W/AUTO DIFF WBC: CPT

## 2024-11-05 PROCEDURE — 80061 LIPID PANEL: CPT

## 2024-11-05 PROCEDURE — 80053 COMPREHEN METABOLIC PANEL: CPT

## 2024-11-05 NOTE — PROGRESS NOTES
Ov Dr Hernandez 1 year follow up  No chest pain or sob  No edema  No dizziness  Lt eye redness  Bloody 4 times  Seen eye doctor.    29 yr old nephew just  Found out he has bone  Cancer.    Bedside echo done    Will see in 1 year.

## 2024-11-07 NOTE — PROGRESS NOTES
Charlie Hernandez M.D.  [facility] [place] [place] [address]  [phone]      2024      RE:  FRANNY CASTILLO   :  1960      CHIEF COMPLAINT:    Chronic atrial fibrillation.  On Xarelto 20 mg daily.  Last cardiac catheterization 2022, that showed 40% in-stent stenosis of the proximal LAD with 60% disease at the level of first OM and AV branch of the circumflex with iFR of 0.93 and 60% disease in the right coronary with iFR of 0.97, normal left ventricular function, ejection fraction 50%.  Medical therapy recommended.  Functional class 1.    HISTORY OF PRESENT ILLNESS:  I had the pleasure of seeing Franny Castillo in the office on 2024.  He is a very pleasant 64-year-old gentleman who I met on 2015, because of chest pain and shortness of breath.  He had an abnormal stress test.  He had catheterization on 2015, that showed 70% disease in the proximal LAD with an FFR of 0.81 with 40% disease in the right coronary and circumflex.  EF 60%.    He did not do angioplasty because of his FFR of 0.81, which was above threshold.  However, he continued to have chest pain and, therefore, I brought him back on 2015, and placed a 2.75 x 33 mm drug-eluting Xience stent in the LAD with a good end result.    He had chest pain and shortness of breath, and he had a mildly abnormal stress test and, therefore, I did his last catheterization on 2022, at [facility] in [place] that showed 40% in-stent stenosis of the proximal LAD with 60% disease at the level of first OM and AV branch of the circumflex with iFR of 0.93 and 60% disease in the right coronary with iFR of 0.97, normal left ventricular function, ejection fraction 50%.  Medical therapy recommended.    He has done well since that time.  He denies any chest pain or chest discomfort.  No shortness of breath.  He has

## 2024-12-19 RX ORDER — LISINOPRIL 5 MG/1
TABLET ORAL
Qty: 90 TABLET | Refills: 3 | Status: SHIPPED | OUTPATIENT
Start: 2024-12-19

## 2025-06-30 RX ORDER — ATORVASTATIN CALCIUM 80 MG/1
80 TABLET, FILM COATED ORAL DAILY
Qty: 90 TABLET | Refills: 3 | Status: SHIPPED | OUTPATIENT
Start: 2025-06-30

## (undated) DEVICE — X-RAY DETECTABLE SPONGES,16 PLY: Brand: VISTEC

## (undated) DEVICE — ELECTRODE ES AD CRD L15FT DISP FOR PT BELOW 30LB REM

## (undated) DEVICE — MEDI-VAC NON-CONDUCTIVE SUCTION TUBING 6MM X 6.1M (20 FT.) L: Brand: CARDINAL HEALTH

## (undated) DEVICE — SPONGE SURG SM 3/8IN WHT PNUT DISECT RADPQ ST

## (undated) DEVICE — GLOVE ORANGE PI 7 1/2   MSG9075

## (undated) DEVICE — DISCONTINUED USE 393278 SYRINGE 10 ML HYPO W/O NDL LL TP PLSTC ST

## (undated) DEVICE — NEEDLE HYPO 22GA L1.5IN BLK S STL HUB POLYPR SHLD REG BVL

## (undated) DEVICE — 3M™ IOBAN™ 2 ANTIMICROBIAL INCISE DRAPE 6650EZ: Brand: IOBAN™ 2

## (undated) DEVICE — SOLUTION IV IRRIG POUR BRL 0.9% SODIUM CHL 2F7124

## (undated) DEVICE — GOWN,AURORA,NONRNF,XL,30/CS: Brand: MEDLINE

## (undated) DEVICE — TIP YANK W/O VENT

## (undated) DEVICE — CHLORAPREP 26ML ORANGE

## (undated) DEVICE — JELLY LUBRICATING 4OZ FLIP TOP TB E Z

## (undated) DEVICE — SUTURE VCRL SZ 3-0 L27IN ABSRB UD L36MM CT-1 1/2 CIR J258H

## (undated) DEVICE — SPONGE LAP ST XRAY 4X18

## (undated) DEVICE — TIP ELECSURG BOVIE

## (undated) DEVICE — LABEL MED L2 1/4IN H7/8IN RED ST UNLESS PKG OPN PERM ADH

## (undated) DEVICE — PENROSE TUBING RADIOPAQUE: Brand: ARGYLE

## (undated) DEVICE — DBD-PACK,LAPAROTOMY,2 REINFORCED GOWNS: Brand: MEDLINE

## (undated) DEVICE — SUTURE VCRL SZ 3-0 L18IN ABSRB UD W/O NDL POLYGLACTIN 910 J110T

## (undated) DEVICE — STAPLER SKIN H3.9MM WIRE DIA0.58MM CRWN 6.9MM 35 STPL ROT

## (undated) DEVICE — SUTURE PROL SZ 2-0 L30IN NONABSORBABLE BLU L26MM CT-2 1/2 8411H

## (undated) DEVICE — 3M™ TEGADERM™ +PAD FILM DRESSING WITH NON-ADHERENT PAD, 3586, 3-1/2 IN X 4 IN (9 CM X 10 CM), 25/CAR, 4 CAR/CS: Brand: 3M™ TEGADERM™

## (undated) DEVICE — SUTURE ABSORBABLE BRAIDED 2-0 CT-1 27 IN UD VICRYL J259H

## (undated) DEVICE — INTENDED FOR TISSUE SEPARATION, AND OTHER PROCEDURES THAT REQUIRE A SHARP SURGICAL BLADE TO PUNCTURE OR CUT.: Brand: BARD-PARKER ® CARBON RIB-BACK BLADES

## (undated) DEVICE — NDLCTR: FOAM/MAG 40CT 64/CS: Brand: MEDICAL ACTION INDUSTRIES

## (undated) DEVICE — FORCEPS BX L240CM JAW DIA2.2MM RAD JAW 4 HOT DISP

## (undated) DEVICE — SKIN MARKER,REGULAR TIP WITH RULER: Brand: DEVON

## (undated) DEVICE — NEEDLE HYPO 18GA L1IN PNK POLYPR HUB S STL REG BVL STR W/O

## (undated) DEVICE — GAUZE SPONGES,8 PLY: Brand: CURITY